# Patient Record
Sex: FEMALE | Race: WHITE | URBAN - METROPOLITAN AREA
[De-identification: names, ages, dates, MRNs, and addresses within clinical notes are randomized per-mention and may not be internally consistent; named-entity substitution may affect disease eponyms.]

---

## 2017-05-13 ENCOUNTER — HOSPITAL ENCOUNTER (EMERGENCY)
Facility: CLINIC | Age: 57
Discharge: HOME | End: 2017-05-13
Attending: EMERGENCY MEDICINE | Admitting: EMERGENCY MEDICINE
Payer: COMMERCIAL

## 2017-05-13 VITALS
RESPIRATION RATE: 18 BRPM | BODY MASS INDEX: 24.33 KG/M2 | WEIGHT: 160 LBS | TEMPERATURE: 98.9 F | SYSTOLIC BLOOD PRESSURE: 165 MMHG | DIASTOLIC BLOOD PRESSURE: 77 MMHG | OXYGEN SATURATION: 98 %

## 2017-05-13 DIAGNOSIS — R51.9 ACUTE NONINTRACTABLE HEADACHE, UNSPECIFIED HEADACHE TYPE: ICD-10-CM

## 2017-05-13 DIAGNOSIS — F41.0 PANIC ATTACK: ICD-10-CM

## 2017-05-13 PROCEDURE — 25000132 ZZH RX MED GY IP 250 OP 250 PS 637: Performed by: EMERGENCY MEDICINE

## 2017-05-13 PROCEDURE — 99283 EMERGENCY DEPT VISIT LOW MDM: CPT

## 2017-05-13 RX ORDER — LORAZEPAM 0.5 MG/1
0.5 TABLET ORAL EVERY 8 HOURS PRN
Qty: 4 TABLET | Refills: 0 | Status: ON HOLD | OUTPATIENT
Start: 2017-05-13 | End: 2017-11-05

## 2017-05-13 RX ORDER — LORAZEPAM 1 MG/1
1 TABLET ORAL ONCE
Status: COMPLETED | OUTPATIENT
Start: 2017-05-13 | End: 2017-05-13

## 2017-05-13 RX ADMIN — LORAZEPAM 1 MG: 1 TABLET ORAL at 01:46

## 2017-05-13 ASSESSMENT — ENCOUNTER SYMPTOMS
HEADACHES: 1
NERVOUS/ANXIOUS: 1

## 2017-05-13 NOTE — ED AVS SNAPSHOT
[facility] Emergency Department    [address]    [facility] [address]    Phone:  [phone]    Fax:  [phone]                                       Des Brooks   MRN: [id number]    Department:  [facility] Emergency Department   Date of Visit:  5/13/2017           After Visit Summary Signature Page     I have received my discharge instructions, and my questions have been answered. I have discussed any challenges I see with this plan with the nurse or doctor.    ..........................................................................................................................................  Patient/Patient Representative Signature      ..........................................................................................................................................  Patient Representative Print Name and Relationship to Patient    ..................................................               ................................................  Date                                            Time    ..........................................................................................................................................  Reviewed by Signature/Title    ...................................................              ..............................................  Date                                                            Time

## 2017-05-13 NOTE — ED PROVIDER NOTES
History     Chief Complaint:  Headache      HPI   Des Brooks is a 56 year old female with a history of anxiety, depression, and HTN who presents to the emergency department for evaluation of headache. The patient states that she developed a headache yesterday morning, which has been present into today. She often gets headaches with her anxiety and notes that she is feeling anxious now. Her continued headache prompted her ED visit today. She denies any recent fevers or injury or trauma to her head.     Allergies:  No Known Allergies     Medications:    Venlafaxine  Promethazine  Remeron  Losartan  Simvastatin  Aspirin  Pantoprazole  Esterified    Past Medical History:    Anxiety  Depression  GERD  HTN  hyperlipidemia    Past Surgical History:    The patient does not have any pertinent past surgical history.    Family History:    No past pertinent family history.    Social History:  Presents with her .   Negative for tobacco use.  Negative for alcohol use.  Marital Status:       Review of Systems   Neurological: Positive for headaches.   Psychiatric/Behavioral: The patient is nervous/anxious.    All other systems reviewed and are negative.    Physical Exam   First Vitals:  BP: 158/92  Heart Rate: 84  Temp: 98.9  F (37.2  C)  Resp: 18  Weight: 72.6 kg (160 lb)  SpO2: 97 %      Physical Exam  Nursing note and vitals reviewed.  Constitutional: Cooperative.    HENT:   Mouth/Throat: Mucous membranes are normal.   No neck rigidity  Eyes: Pupils are equal, round, and reactive to light. EOMI  Cardiovascular: Normal rate, regular rhythm and normal heart sounds.  No murmur.  Pulmonary/Chest: Effort normal and breath sounds normal. No respiratory distress. No wheezes. No rales. .   Musculoskeletal: Normal range of motion.Freely moving her neck.   Neurological: Alert. Oriented x4.    Skin: Skin is warm and dry.   Psychiatric: Anxious appearing. Pressured speech. No SI.     Emergency Department Course    Interventions:  0146 Lorazepam, 1 mg, PO    Emergency Department Course:  Nursing notes and vitals reviewed. I performed an exam of the patient as documented above.     IV inserted. Medicine administered as documented above.     0230 I rechecked the patient regarding her current symptoms.     Findings and plan explained to the Patient. Patient discharged home with instructions regarding supportive care, medications, and reasons to return. The importance of close follow-up was reviewed. The patient was prescribed Ativan.     Impression & Plan    Medical Decision Making:  Des Brooks is a 56 year old female who presents with anxiety and headache. She relates the headache she is having with panic attacks. She is managed on outpatient medications as above. She is requesting Ativan and after a single oral dose here, is feeling much better. She persistently asks for Ativan at discharge. I am getting some suspicion that she may be trying to obtain additional benzodiazepines. Unfortunately, I am not able to look at the prescription monitoring program this evening. I will write for four 0.5 mg tablets of Ativan and have her follow up with her regular physician as needed. No concern for acute underlying etiology for her pain, such as bleed, tumors, strokes, or trauma.     Diagnosis:    ICD-10-CM   1. Panic attack F41.0   2. Acute nonintractable headache, unspecified headache type R51       Disposition:  discharged to home    Discharge Medications:   Details   LORazepam (ATIVAN) 0.5 MG tablet Take 1 tablet (0.5 mg) by mouth every 8 hours as needed for anxiety, Disp-4 tablet, R-0, Local Print     I, Layla Vazquez, am serving as a scribe on 5/13/2017 at 1:39 AM to personally document services performed by Jt Moss MD based on my observations and the provider's statements to me.     Layla Vazquez  5/13/2017   [facility] EMERGENCY DEPARTMENT       Jt Moss MD  05/13/17 0453

## 2017-05-13 NOTE — ED NOTES
Alert and oriented x 3 airway,breathing and circulation intact, headache since this am ligfht and sound sensitive, nausea, pain is over entire head

## 2017-05-13 NOTE — ED AVS SNAPSHOT
[facility] Emergency Department    [address] [place] [address]    Phone:  [phone]    Fax:  [phone]                                       Des Brooks   MRN: [id number]    Department:  [facility] Emergency Department   Date of Visit:  5/13/2017           Patient Information     Date Of Birth          1960        Your diagnoses for this visit were:     Panic attack     Acute nonintractable headache, unspecified headache type        You were seen by Jt Moss MD.      Follow-up Information     Follow up with [facility], [place].    Specialty:  Family Practice    Why:  As needed    Contact information:    [address] [place] [address]  [phone]        Discharge References/Attachments     PANIC ATTACK (ENGLISH)      24 Hour Appointment Hotline       To make an appointment at any [facility] clinic, call [phone] ([phone]). If you don't have a family doctor or clinic, we will help you find one. [facility] clinics are conveniently located to serve the needs of you and your family.             Review of your medicines      START taking        Dose / Directions Last dose taken    LORazepam 0.5 MG tablet   Commonly known as:  ATIVAN   Dose:  0.5 mg   Quantity:  4 tablet        Take 1 tablet (0.5 mg) by mouth every 8 hours as needed for anxiety   Refills:  0          Our records show that you are taking the medicines listed below. If these are incorrect, please call your family doctor or clinic.        Dose / Directions Last dose taken    aspirin 81 MG tablet        Take by mouth daily   Refills:  0        ESTERIFIED ESTROGENS PO        Refills:  0        LOSARTAN POTASSIUM PO        Refills:  0        PANTOPRAZOLE SODIUM PO        Refills:  0        PROMETHAZINE HCL RE        Refills:  0        REMERON PO        Refills:  0        SIMVASTATIN PO        Refills:  0        VENLAFAXINE HCL PO        Refills:  0                 Prescriptions were sent or printed at these locations (1 Prescription)                   Other Prescriptions                Printed at Department/Unit printer (1 of 1)         LORazepam (ATIVAN) 0.5 MG tablet                Orders Needing Specimen Collection     None      Pending Results     No orders found from 5/11/2017 to 5/14/2017.            Pending Culture Results     No orders found from 5/11/2017 to 5/14/2017.            Pending Results Instructions     If you had any lab results that were not finalized at the time of your Discharge, you can call the ED Lab Result RN at [phone]. You will be contacted by this team for any positive Lab results or changes in treatment. The nurses are available 7 days a week from 10A to 6:30P.  You can leave a message 24 hours per day and they will return your call.        Test Results From Your Hospital Stay               Clinical Quality Measure: Blood Pressure Screening     Your blood pressure was checked while you were in the emergency department today. The last reading we obtained was  BP: 165/77 . Please read the guidelines below about what these numbers mean and what you should do about them.  If your systolic blood pressure (the top number) is less than 120 and your diastolic blood pressure (the bottom number) is less than 80, then your blood pressure is normal. There is nothing more that you need to do about it.  If your systolic blood pressure (the top number) is 120-139 or your diastolic blood pressure (the bottom number) is 80-89, your blood pressure may be higher than it should be. You should have your blood pressure rechecked within a year by a primary care provider.  If your systolic blood pressure (the top number) is 140 or greater or your diastolic blood pressure (the bottom number) is 90 or greater, you may have high blood pressure. High blood pressure is treatable, but if left untreated over time it can put you at risk for heart attack, stroke, or  "kidney failure. You should have your blood pressure rechecked by a primary care provider within the next 4 weeks.  If your provider in the emergency department today gave you specific instructions to follow-up with your doctor or provider even sooner than that, you should follow that instruction and not wait for up to 4 weeks for your follow-up visit.        Thank you for choosing [facility]       Thank you for choosing [facility] for your care. Our goal is always to provide you with excellent care. Hearing back from our patients is one way we can continue to improve our services. Please take a few minutes to complete the written survey that you may receive in the mail after you visit with us. Thank you!        [employer] Information     [employer] lets you send messages to your doctor, view your test results, renew your prescriptions, schedule appointments and more. To sign up, go to [email or web address] . Click on \"Log in\" on the left side of the screen, which will take you to the Welcome page. Then click on \"Sign up Now\" on the right side of the page.     You will be asked to enter the access code listed below, as well as some personal information. Please follow the directions to create your username and password.     Your access code is: [email or web address]  Expires: 2017  2:37 AM     Your access code will  in 90 days. If you need help or a new code, please call your [facility] clinic or [phone].        Care EveryWhere ID     This is your Care EveryWhere ID. This could be used by other organizations to access your [facility] medical records  [id number]        After Visit Summary       This is your record. Keep this with you and show to your community pharmacist(s) and doctor(s) at your next visit.                  "

## 2017-05-13 NOTE — ED NOTES
Pt states she has chronic headaches which she says are from anxiety. Pt complaining of anxiety, nausea, and stress which are her largest concern. Denies any other medical issues. ABC's intact.

## 2017-05-26 ENCOUNTER — HOSPITAL ENCOUNTER (EMERGENCY)
Facility: CLINIC | Age: 57
Discharge: HOME | End: 2017-05-26
Attending: EMERGENCY MEDICINE | Admitting: EMERGENCY MEDICINE
Payer: COMMERCIAL

## 2017-05-26 VITALS
OXYGEN SATURATION: 98 % | BODY MASS INDEX: 24.94 KG/M2 | DIASTOLIC BLOOD PRESSURE: 100 MMHG | WEIGHT: 164 LBS | TEMPERATURE: 98.3 F | HEART RATE: 78 BPM | SYSTOLIC BLOOD PRESSURE: 153 MMHG | RESPIRATION RATE: 20 BRPM

## 2017-05-26 DIAGNOSIS — F41.9 ANXIETY AND DEPRESSION: ICD-10-CM

## 2017-05-26 DIAGNOSIS — F32.A ANXIETY AND DEPRESSION: ICD-10-CM

## 2017-05-26 PROCEDURE — 99285 EMERGENCY DEPT VISIT HI MDM: CPT | Mod: 25

## 2017-05-26 PROCEDURE — 25000132 ZZH RX MED GY IP 250 OP 250 PS 637: Performed by: EMERGENCY MEDICINE

## 2017-05-26 PROCEDURE — 90791 PSYCH DIAGNOSTIC EVALUATION: CPT

## 2017-05-26 RX ORDER — ALPRAZOLAM 0.5 MG
0.5 TABLET ORAL ONCE
Status: COMPLETED | OUTPATIENT
Start: 2017-05-26 | End: 2017-05-26

## 2017-05-26 RX ADMIN — ALPRAZOLAM 0.5 MG: 0.5 TABLET ORAL at 17:57

## 2017-05-26 NOTE — ED NOTES
"Pt arrives to the ED for depression. Pt crying on arrival. Pt states she was at the mall yesterday when she went to show her  a knife to see if it was good and was caught by store employees and told she was a thief, police were called. Pt states she was not trying to steal but that they said hurtful things to her and told her she should be in [facility]. Pt states \"I wanted to die\". Per  she did not sleep last night and has not ate anything today. Pt visibly upset. Pt denies any plan of hurting herself at this time but does have thoughts of suicide.   "

## 2017-05-26 NOTE — ED AVS SNAPSHOT
[facility] Emergency Department    [address] [facility] [address]    Phone:  [phone]    Fax:  [phone]                                       Des Brooks   MRN: [id number]    Department:  [facility] Emergency Department   Date of Visit:  5/26/2017           Patient Information     Date Of Birth          1960        Your diagnoses for this visit were:     Anxiety and depression        You were seen by Rodolfo Zarate MD.      Follow-up Information     Call Osiel Carballo MD.    Why:  call Monday    Contact information:    [facility]  [address] [place] [address]  [phone]          Follow up with [facility] Emergency Department.    Specialty:  EMERGENCY MEDICINE    Why:  As needed, If symptoms worsen    Contact information:    [address] [place] [id number] [phone]      Discharge References/Attachments     ANXIETY DISORDERS, TREATING, WITH MEDICATION (ENGLISH)    ANXIETY DISORDERS, TREATING, WITH THERAPY   (ENGLISH)      24 Hour Appointment Hotline       To make an appointment at any [facility], call [phone] ([phone]). If you don't have a family doctor or clinic, we will help you find one. [facility] clinics are conveniently located to serve the needs of you and your family.             Review of your medicines      Our records show that you are taking the medicines listed below. If these are incorrect, please call your family doctor or clinic.        Dose / Directions Last dose taken    aspirin 81 MG tablet        Take by mouth daily   Refills:  0        ESTERIFIED ESTROGENS PO        Refills:  0        LORazepam 0.5 MG tablet   Commonly known as:  ATIVAN   Dose:  0.5 mg   Quantity:  4 tablet        Take 1 tablet (0.5 mg) by mouth every 8 hours as needed for anxiety   Refills:  0        LOSARTAN POTASSIUM PO        Refills:  0        PANTOPRAZOLE SODIUM PO         Refills:  0        PROMETHAZINE HCL RE        Refills:  0        REMERON PO        Refills:  0        SIMVASTATIN PO        Refills:  0        VENLAFAXINE HCL PO        Refills:  0                Orders Needing Specimen Collection     None      Pending Results     No orders found from 5/24/2017 to 5/27/2017.            Pending Culture Results     No orders found from 5/24/2017 to 5/27/2017.            Pending Results Instructions     If you had any lab results that were not finalized at the time of your Discharge, you can call the ED Lab Result RN at [phone]. You will be contacted by this team for any positive Lab results or changes in treatment. The nurses are available 7 days a week from 10A to 6:30P.  You can leave a message 24 hours per day and they will return your call.        Test Results From Your Hospital Stay               Clinical Quality Measure: Blood Pressure Screening     Your blood pressure was checked while you were in the emergency department today. The last reading we obtained was  BP: (!) 171/100 . Please read the guidelines below about what these numbers mean and what you should do about them.  If your systolic blood pressure (the top number) is less than 120 and your diastolic blood pressure (the bottom number) is less than 80, then your blood pressure is normal. There is nothing more that you need to do about it.  If your systolic blood pressure (the top number) is 120-139 or your diastolic blood pressure (the bottom number) is 80-89, your blood pressure may be higher than it should be. You should have your blood pressure rechecked within a year by a primary care provider.  If your systolic blood pressure (the top number) is 140 or greater or your diastolic blood pressure (the bottom number) is 90 or greater, you may have high blood pressure. High blood pressure is treatable, but if left untreated over time it can put you at risk for heart attack, stroke, or kidney failure. You should  "have your blood pressure rechecked by a primary care provider within the next 4 weeks.  If your provider in the emergency department today gave you specific instructions to follow-up with your doctor or provider even sooner than that, you should follow that instruction and not wait for up to 4 weeks for your follow-up visit.        Thank you for choosing [facility]       Thank you for choosing [facility] for your care. Our goal is always to provide you with excellent care. Hearing back from our patients is one way we can continue to improve our services. Please take a few minutes to complete the written survey that you may receive in the mail after you visit with us. Thank you!        [employer] Information     [employer] lets you send messages to your doctor, view your test results, renew your prescriptions, schedule appointments and more. To sign up, go to [email or web address] . Click on \"Log in\" on the left side of the screen, which will take you to the Welcome page. Then click on \"Sign up Now\" on the right side of the page.     You will be asked to enter the access code listed below, as well as some personal information. Please follow the directions to create your username and password.     Your access code is: [email or web address]  Expires: 2017  2:37 AM     Your access code will  in 90 days. If you need help or a new code, please call your [facility] clinic or [phone].        Care EveryWhere ID     This is your Care EveryWhere ID. This could be used by other organizations to access your [facility] medical records  [id number]        After Visit Summary       This is your record. Keep this with you and show to your community pharmacist(s) and doctor(s) at your next visit.                  "

## 2017-05-26 NOTE — ED AVS SNAPSHOT
[facility] Emergency Department    [address]    [facility] [address]    Phone:  [phone]    Fax:  [phone]                                       Des Brooks   MRN: [id number]    Department:  [facility] Emergency Department   Date of Visit:  5/26/2017           After Visit Summary Signature Page     I have received my discharge instructions, and my questions have been answered. I have discussed any challenges I see with this plan with the nurse or doctor.    ..........................................................................................................................................  Patient/Patient Representative Signature      ..........................................................................................................................................  Patient Representative Print Name and Relationship to Patient    ..................................................               ................................................  Date                                            Time    ..........................................................................................................................................  Reviewed by Signature/Title    ...................................................              ..............................................  Date                                                            Time

## 2017-05-31 NOTE — ED PROVIDER NOTES
History     Chief Complaint:  Depression    HPI   Des Brooks is a 56 year old female who presents with anxiety and depression. Pt crying on arrival. Pt has had worsening symptoms over the last several days, but things got worse yesterday when she states she was accused of stealing. Police were called. Pt reports she has a court date coming up. Pt had tough time sleeping last night and hasn't eaten today. Pt sees a psychologist and psychiatrist and is on medication. She reports the xanax she takes hasn't worked today. Pt reports feeling like she wanted to die, but denies any current plan.     Allergies:  No Known Allergies     Medications:      VENLAFAXINE HCL PO   PROMETHAZINE HCL RE   Mirtazapine (REMERON PO)   LORazepam (ATIVAN) 0.5 MG tablet   LOSARTAN POTASSIUM PO   SIMVASTATIN PO   aspirin 81 MG tablet   ESTERIFIED ESTROGENS PO   PANTOPRAZOLE SODIUM PO       Problem List:    There are no active problems to display for this patient.       Past Medical History:    Past Medical History:   Diagnosis Date     Anxiety      Depressive disorder      GERD (gastroesophageal reflux disease)      Hypercholesterolemia      Hypertension        Past Surgical History:    History reviewed. No pertinent surgical history.    Family History:    No family history on file.    Social History:  Marital Status:   [2]  Social History   Substance Use Topics     Smoking status: Never Smoker     Smokeless tobacco: Not on file     Alcohol use No        Review of Systems  Positive for anxiety and depression  All other systems reviewed and negative.     Physical Exam   First Vitals:  BP: (!) 171/100  Pulse: 78  Heart Rate: 78  Temp: 98.3  F (36.8  C)  Resp: 20  Weight: 74.4 kg (164 lb)  SpO2: 94 %    Physical Exam  General: Patient is alert and interactive when I enter the room  Head:  The scalp, face, and head appear normal  Eyes:  Conjunctivae are normal  Neck:  Trachea midline  CV:  Normal rate.  Resp:  No respiratory distress    Musc:  Normal muscular tone    No major joint effusions    No asymmetric leg swelling    Good capillary refill noted  Skin:  No rash or lesions noted  Neuro: Speech is normal and fluent. Face is symmetric.     Moving all extremities well.   Psych:  Depressed mood, congruent affect. Poor insight and judgment.         Emergency Department Course           Interventions:  Medications   ALPRAZolam (XANAX) tablet 0.5 mg (0.5 mg Oral Given 5/26/17 7504)         Impression & Plan         Medical Decision Making:  Pt presents with anxiety and depression. She has suicidal thoughts but no plan. She is already under the care of a psychiatrist/psychologist. She is given xanax here with good improvement in her symptoms. DEC saw and evaluated pt here and pt contracts for safety. No indication for involuntary hold at this time. Pt does not want voluntary hold and wishes to return home. She will continue to use xanax for panic attacks and follow-up with her outpt providers.  feels pt will be safe at home. She is discharged in stable condition.     Diagnosis:    ICD-10-CM    1. Anxiety and depression F41.9     F32.9        Disposition:  discharged to home    Discharge Medications:  Discharge Medication List as of 5/26/2017  8:54 PM            Rodolfo Zarate  5/26/2017   [facility] EMERGENCY DEPARTMENT       Rodolfo Zarate MD  05/31/17 0407

## 2017-06-26 ENCOUNTER — HOSPITAL ENCOUNTER (EMERGENCY)
Facility: CLINIC | Age: 57
Discharge: HOME | End: 2017-06-27
Attending: EMERGENCY MEDICINE | Admitting: EMERGENCY MEDICINE
Payer: COMMERCIAL

## 2017-06-26 VITALS
OXYGEN SATURATION: 98 % | HEART RATE: 73 BPM | DIASTOLIC BLOOD PRESSURE: 87 MMHG | SYSTOLIC BLOOD PRESSURE: 132 MMHG | RESPIRATION RATE: 16 BRPM | TEMPERATURE: 98.8 F

## 2017-06-26 DIAGNOSIS — F41.9 ANXIETY: ICD-10-CM

## 2017-06-26 PROCEDURE — 99284 EMERGENCY DEPT VISIT MOD MDM: CPT | Mod: 25

## 2017-06-26 PROCEDURE — 25000128 H RX IP 250 OP 636: Performed by: EMERGENCY MEDICINE

## 2017-06-26 PROCEDURE — 96372 THER/PROPH/DIAG INJ SC/IM: CPT

## 2017-06-26 RX ORDER — DIAZEPAM 2 MG
2 TABLET ORAL EVERY 12 HOURS PRN
Qty: 20 TABLET | Refills: 0 | Status: ON HOLD | OUTPATIENT
Start: 2017-06-26 | End: 2017-11-05

## 2017-06-26 RX ORDER — ALPRAZOLAM 1 MG
TABLET ORAL
Qty: 15 TABLET | Refills: 0 | Status: SHIPPED | OUTPATIENT
Start: 2017-06-26

## 2017-06-26 RX ORDER — DIAZEPAM 10 MG/2ML
5 INJECTION, SOLUTION INTRAMUSCULAR; INTRAVENOUS ONCE
Status: COMPLETED | OUTPATIENT
Start: 2017-06-26 | End: 2017-06-26

## 2017-06-26 RX ADMIN — DIAZEPAM 5 MG: 5 INJECTION, SOLUTION INTRAMUSCULAR; INTRAVENOUS at 22:41

## 2017-06-26 ASSESSMENT — ENCOUNTER SYMPTOMS
VOMITING: 0
ABDOMINAL PAIN: 1
NERVOUS/ANXIOUS: 1
NAUSEA: 1

## 2017-06-26 NOTE — ED AVS SNAPSHOT
[facility] Emergency Department    [address] [place] [address]    Phone:  [phone]    Fax:  [phone]                                       Des Brooks   MRN: [id number]    Department:  [facility] Emergency Department   Date of Visit:  6/26/2017           Patient Information     Date Of Birth          1960        Your diagnoses for this visit were:     Anxiety        You were seen by Maisha Dorsey MD.      Follow-up Information     Follow up with Thang Arenas MD.    Specialty:  Internal Medicine    Contact information:    [facility]  [address]  [place] [address]  [phone]        24 Hour Appointment Hotline       To make an appointment at any [facility], call [phone] ([phone]). If you don't have a family doctor or clinic, we will help you find one. [facility] are conveniently located to serve the needs of you and your family.             Review of your medicines      START taking        Dose / Directions Last dose taken    ALPRAZolam 1 MG tablet   Commonly known as:  XANAX   Quantity:  15 tablet        Take 0.5mg to 1mg every 8-12 hrs as needed   Refills:  0        diazepam 2 MG tablet   Commonly known as:  VALIUM   Dose:  2 mg   Quantity:  20 tablet        Take 1 tablet (2 mg) by mouth every 12 hours as needed for anxiety or sleep   Refills:  0          Our records show that you are taking the medicines listed below. If these are incorrect, please call your family doctor or clinic.        Dose / Directions Last dose taken    aspirin 81 MG tablet        Take by mouth daily   Refills:  0        ATORVASTATIN CALCIUM PO        Refills:  0        ESTERIFIED ESTROGENS PO        Refills:  0        LORazepam 0.5 MG tablet   Commonly known as:  ATIVAN   Dose:  0.5 mg   Quantity:  4 tablet        Take 1 tablet (0.5 mg) by mouth every 8 hours as needed for anxiety   Refills:  0        LOSARTAN POTASSIUM  PO        Refills:  0        PANTOPRAZOLE SODIUM PO        Refills:  0        PROMETHAZINE HCL RE        Refills:  0        REMERON PO        Refills:  0        SIMVASTATIN PO        Refills:  0        TRAZODONE HCL PO        Refills:  0        VENLAFAXINE HCL PO        Refills:  0        ZOLOFT PO        Take by mouth daily   Refills:  0                Prescriptions were sent or printed at these locations (2 Prescriptions)                   Other Prescriptions                Printed at Department/Unit printer (2 of 2)         ALPRAZolam (XANAX) 1 MG tablet               diazepam (VALIUM) 2 MG tablet                Orders Needing Specimen Collection     None      Pending Results     No orders found for last 3 day(s).            Pending Culture Results     No orders found for last 3 day(s).            Pending Results Instructions     If you had any lab results that were not finalized at the time of your Discharge, you can call the ED Lab Result RN at [phone]. You will be contacted by this team for any positive Lab results or changes in treatment. The nurses are available 7 days a week from 10A to 6:30P.  You can leave a message 24 hours per day and they will return your call.        Test Results From Your Hospital Stay               Clinical Quality Measure: Blood Pressure Screening     Your blood pressure was checked while you were in the emergency department today. The last reading we obtained was  BP: 132/87 . Please read the guidelines below about what these numbers mean and what you should do about them.  If your systolic blood pressure (the top number) is less than 120 and your diastolic blood pressure (the bottom number) is less than 80, then your blood pressure is normal. There is nothing more that you need to do about it.  If your systolic blood pressure (the top number) is 120-139 or your diastolic blood pressure (the bottom number) is 80-89, your blood pressure may be higher than it should be. You  "should have your blood pressure rechecked within a year by a primary care provider.  If your systolic blood pressure (the top number) is 140 or greater or your diastolic blood pressure (the bottom number) is 90 or greater, you may have high blood pressure. High blood pressure is treatable, but if left untreated over time it can put you at risk for heart attack, stroke, or kidney failure. You should have your blood pressure rechecked by a primary care provider within the next 4 weeks.  If your provider in the emergency department today gave you specific instructions to follow-up with your doctor or provider even sooner than that, you should follow that instruction and not wait for up to 4 weeks for your follow-up visit.        Thank you for choosing [facility]       Thank you for choosing [facility] for your care. Our goal is always to provide you with excellent care. Hearing back from our patients is one way we can continue to improve our services. Please take a few minutes to complete the written survey that you may receive in the mail after you visit with us. Thank you!        [employer] Information     [employer] lets you send messages to your doctor, view your test results, renew your prescriptions, schedule appointments and more. To sign up, go to [email or web address] . Click on \"Log in\" on the left side of the screen, which will take you to the Welcome page. Then click on \"Sign up Now\" on the right side of the page.     You will be asked to enter the access code listed below, as well as some personal information. Please follow the directions to create your username and password.     Your access code is: [email or web address]  Expires: 2017  2:37 AM     Your access code will  in 90 days. If you need help or a new code, please call your [facility] clinic or [phone].        Care EveryWhere ID     This is your Care EveryWhere ID. This could be used by other organizations to access your [facility] medical " records  [id number]        Equal Access to Services     ADIA PATEL : Mikki Borja, dilia yousif, judith najera, shey green. So [facility] [phone].    ATENCIÓN: Si habla español, tiene a gibson disposición servicios gratuitos de asistencia lingüística. Llame al [phone].    We comply with applicable federal civil rights laws and [place] laws. We do not discriminate on the basis of race, color, national origin, age, disability sex, sexual orientation or gender identity.            After Visit Summary       This is your record. Keep this with you and show to your community pharmacist(s) and doctor(s) at your next visit.

## 2017-06-27 ASSESSMENT — ENCOUNTER SYMPTOMS
NECK PAIN: 1
FEVER: 0
SHORTNESS OF BREATH: 0
CHILLS: 0

## 2017-06-27 NOTE — ED NOTES
"Pt reports feelings of anxiety and depression for past few months. States she has seen a psychiatrist who prescribed effexor with no relief of symptoms so she was switched to zoloft and trazodone one week ago. Pt c/o associated symptoms of nausea, tightness in upper chest/neck. Pt states \"I have been taking zofran with no relief\". Denies vomiting.  Pt is alert and oriented, ABCs intact.   "

## 2017-06-27 NOTE — ED PROVIDER NOTES
History     Chief Complaint:  Anxiety     HPI   Des Brooks is a 56 year old female with a history of anxiety who presents to the emergency department today for evaluation of anxiety. The patient complains of chronic anxiety that worsened this evening. Patient was managing her anxiety with Effexor but was recently switched to Zoloft by her psychiatrist 7 days ago. Patient has also used 0.25 mg Xanax but she ran out of this medication.  She notes her anxiety did not improve with these medications. Patient also reports associated lower neck discomfort, nausea, and diffuse abdominal cramping. She has used Zofran with no improvement in nausea. Patient was able to eat breakfast and lunch but she did not eat dinner. Herbertt has been seen in the ED 2-3 in the past for her anxiety. No vomiting. No chest pain. No shortness of breath.No fevers. No chills. No suicidal ideation.     Allergies:  No Known Drug Allergies    Medications:    ATORVASTATIN CALCIUM PO  Sertraline HCl (ZOLOFT PO)  TRAZODONE HCL PO  Mirtazapine (REMERON PO)  LORazepam (ATIVAN) 0.5 MG tablet  LOSARTAN POTASSIUM PO  SIMVASTATIN PO  aspirin 81 MG tablet  VENLAFAXINE HCL PO  PROMETHAZINE HCL RE  ESTERIFIED ESTROGENS PO  PANTOPRAZOLE SODIUM PO  Xanax       Past Medical History:    Anxiety   Depressive disorder   GERD (gastroesophageal reflux disease)   Hypercholesterolemia   Hypertension       Past Surgical History:    History reviewed. No pertinent past surgical history.    Family History:    History reviewed. No pertinent family history.     Social History:  The patient was accompanied to the ED by .  Smoking Status: Never smoker  Smokeless Tobacco: No  Alcohol Use: No  Marital Status:   [2]     Review of Systems   Constitutional: Negative for chills and fever.   HENT:        Throat pain   Respiratory: Negative for shortness of breath.    Cardiovascular: Negative for chest pain.   Gastrointestinal: Positive for abdominal pain and nausea.  "Negative for vomiting.   Musculoskeletal: Positive for neck pain.   Psychiatric/Behavioral: Negative for suicidal ideas. The patient is nervous/anxious.    All other systems reviewed and are negative.    Pt reports feelings of anxiety and depression for past few months. States she has seen a psychiatrist who prescribed effexor with no relief of symptoms so she was switched to zoloft and trazodone one week ago. Pt c/o associated symptoms of nausea, tightness in upper chest/neck. Pt states \"I have been taking zofran with no relief\". Denies vomiting.  Physical Exam   Vitals:  Patient Vitals for the past 24 hrs:   BP Temp Temp src Pulse Heart Rate Resp SpO2   06/26/17 2210 - 98.8  F (37.1  C) Temporal - - - -   06/26/17 2205 132/87 - - 73 73 16 98 %       Physical Exam  GEN: patient conversive  HEAD: atraumatic, normocephalic  EYES: pupils reactive,  extraocular muscles intact, conjunctivae normal  ENT: TMs flat and white bilaterally, oropharynx normal with no erythema or exudate, mucus membranes moist  NECK: no cervical LAD  RESPIRATORY: no tachypnea, breath sounds clear to auscultation (no rales, wheezes, rhonchi)  CVS: normal S1/S2, no murmurs/rubs/gallops  ABDOMEN: soft, nontender, no masses or organomegaly, no rebound, positive bowel sounds  BACK: no CVAT  EXTREMITIES: intact pulses x 2 (radial pulses intact) no edema  SKIN: warm and dry  NEURO: Alert  Motor- moves all 4 extremities Sensation- intact  Coordination-ambulatory.  Overall symmetrical exam  HEME: no bruising or petechiae/contusions  LYMPH: no lymphadenopathy    Emergency Department Course   Interventions:  2241- Valium 5 mg IM       Emergency Department Course:  Nursing notes and vitals reviewed.  I performed an exam of the patient as documented above.       11:52 PM recheck    I discussed the treatment plan with the patient. They expressed understanding of this plan and consented to discharge.    I personally reviewed the laboratory results with the " Patient and answered all related questions prior to discharge.  /87  Pulse 73  Temp 98.8  F (37.1  C) (Temporal)  Resp 16  SpO2 98%  Patient and  much improved, given a plan for followup    Impression & Plan      Medical Decision Making:  Des Brooks is a 56 year old female who does have a history of anxiety who presents with similar episode of anxiety. She used to be on Xanax .25 mg but her primary psychiatrist did not provide enough. She is recently switched to another psychiatric medicine and states that she would like an injection to help with the discomfort. Denies any fevers or chills. No chest pain or shortness of breath. She has not had thoughts of hurting herself. Here she requested an injection and we did give an IM injection. She did feel better and more calm. I did write her for the higher dose of Xanax and she would like to try Valium so I did give her limited prescription and low doses on both. She will call her psychiatrist tomorrow to get an appointment. Vitals signs were stable and medical exam did not show any abnormalities .      Diagnosis:    ICD-10-CM    1. Anxiety F41.9          Disposition:   The patient was discharged.    Discharge Medications:  New Prescriptions    ALPRAZOLAM (XANAX) 1 MG TABLET    Take 0.5mg to 1mg every 8-12 hrs as needed    DIAZEPAM (VALIUM) 2 MG TABLET    Take 1 tablet (2 mg) by mouth every 12 hours as needed for anxiety or sleep       Scribe Disclosure:  Lonnie FRANCE, am serving as a scribe at 10:34 PM on 6/26/2017 to document services personally performed by Maisha Dorsey MD, based on my observations and the provider's statements to me.    6/26/2017   [facility] EMERGENCY DEPARTMENT       Maisha Dorsey MD  06/27/17 0154

## 2017-07-12 NOTE — ED NOTES
"In reapply to pt's amendment request to triage note. While I recall pt stated \"knife\", I can not deny or confirm due to pt vigorously crying and having accent.   "

## 2017-09-08 NOTE — ED NOTES
Addendum.    On 8/30/2017 documentation was presented to myself, ED Manager, that supports claim of reporting clothing vs knife to RN.  Late entry - 9/8/2017 at 1308.

## 2017-11-05 ENCOUNTER — HOSPITAL ENCOUNTER (EMERGENCY)
Facility: CLINIC | Age: 57
Discharge: PSYCHIATRIC OR SUBSTANCE USE FACILITY | End: 2017-11-05
Attending: EMERGENCY MEDICINE | Admitting: EMERGENCY MEDICINE
Payer: COMMERCIAL

## 2017-11-05 ENCOUNTER — HOSPITAL ENCOUNTER (INPATIENT)
Facility: CLINIC | Age: 57
LOS: 1 days | Discharge: HOME | DRG: 885 | End: 2017-11-06
Attending: PSYCHIATRY & NEUROLOGY | Admitting: PSYCHIATRY & NEUROLOGY
Payer: COMMERCIAL

## 2017-11-05 VITALS
TEMPERATURE: 98.7 F | SYSTOLIC BLOOD PRESSURE: 116 MMHG | DIASTOLIC BLOOD PRESSURE: 57 MMHG | RESPIRATION RATE: 30 BRPM | OXYGEN SATURATION: 93 % | HEART RATE: 95 BPM

## 2017-11-05 DIAGNOSIS — F32.A DEPRESSION, UNSPECIFIED DEPRESSION TYPE: ICD-10-CM

## 2017-11-05 DIAGNOSIS — F41.9 ANXIETY: ICD-10-CM

## 2017-11-05 LAB
ALBUMIN SERPL-MCNC: 3.5 G/DL (ref 3.4–5)
ALBUMIN UR-MCNC: NEGATIVE MG/DL
ALP SERPL-CCNC: 84 U/L (ref 40–150)
ALT SERPL W P-5'-P-CCNC: 25 U/L (ref 0–50)
AMPHETAMINES UR QL SCN: NEGATIVE
ANION GAP SERPL CALCULATED.3IONS-SCNC: 8 MMOL/L (ref 3–14)
APAP SERPL-MCNC: <2 MG/L (ref 10–20)
APPEARANCE UR: CLEAR
AST SERPL W P-5'-P-CCNC: 15 U/L (ref 0–45)
BACTERIA #/AREA URNS HPF: ABNORMAL /HPF
BARBITURATES UR QL: NEGATIVE
BASOPHILS # BLD AUTO: 0.1 10E9/L (ref 0–0.2)
BASOPHILS NFR BLD AUTO: 0.9 %
BENZODIAZ UR QL: POSITIVE
BILIRUB SERPL-MCNC: 0.2 MG/DL (ref 0.2–1.3)
BILIRUB UR QL STRIP: NEGATIVE
BUN SERPL-MCNC: 18 MG/DL (ref 7–30)
CALCIUM SERPL-MCNC: 8.8 MG/DL (ref 8.5–10.1)
CANNABINOIDS UR QL SCN: NEGATIVE
CHLORIDE SERPL-SCNC: 106 MMOL/L (ref 94–109)
CO2 SERPL-SCNC: 26 MMOL/L (ref 20–32)
COCAINE UR QL: NEGATIVE
COLOR UR AUTO: ABNORMAL
CREAT SERPL-MCNC: 0.73 MG/DL (ref 0.52–1.04)
DIFFERENTIAL METHOD BLD: NORMAL
EOSINOPHIL # BLD AUTO: 0.3 10E9/L (ref 0–0.7)
EOSINOPHIL NFR BLD AUTO: 4 %
ERYTHROCYTE [DISTWIDTH] IN BLOOD BY AUTOMATED COUNT: 14.1 % (ref 10–15)
GFR SERPL CREATININE-BSD FRML MDRD: 82 ML/MIN/1.7M2
GLUCOSE SERPL-MCNC: 113 MG/DL (ref 70–99)
GLUCOSE UR STRIP-MCNC: NEGATIVE MG/DL
HCG UR QL: NEGATIVE
HCT VFR BLD AUTO: 43 % (ref 35–47)
HGB BLD-MCNC: 13.9 G/DL (ref 11.7–15.7)
HGB UR QL STRIP: ABNORMAL
IMM GRANULOCYTES # BLD: 0 10E9/L (ref 0–0.4)
IMM GRANULOCYTES NFR BLD: 0.5 %
KETONES UR STRIP-MCNC: NEGATIVE MG/DL
LEUKOCYTE ESTERASE UR QL STRIP: ABNORMAL
LIPASE SERPL-CCNC: 222 U/L (ref 73–393)
LYMPHOCYTES # BLD AUTO: 2.4 10E9/L (ref 0.8–5.3)
LYMPHOCYTES NFR BLD AUTO: 37.2 %
MCH RBC QN AUTO: 28 PG (ref 26.5–33)
MCHC RBC AUTO-ENTMCNC: 32.3 G/DL (ref 31.5–36.5)
MCV RBC AUTO: 87 FL (ref 78–100)
MONOCYTES # BLD AUTO: 0.6 10E9/L (ref 0–1.3)
MONOCYTES NFR BLD AUTO: 8.9 %
MUCOUS THREADS #/AREA URNS LPF: PRESENT /LPF
NEUTROPHILS # BLD AUTO: 3.2 10E9/L (ref 1.6–8.3)
NEUTROPHILS NFR BLD AUTO: 48.5 %
NITRATE UR QL: NEGATIVE
NRBC # BLD AUTO: 0 10*3/UL
NRBC BLD AUTO-RTO: 0 /100
OPIATES UR QL SCN: NEGATIVE
PCP UR QL SCN: NEGATIVE
PH UR STRIP: 9 PH (ref 5–7)
PLATELET # BLD AUTO: 417 10E9/L (ref 150–450)
POTASSIUM SERPL-SCNC: 3.8 MMOL/L (ref 3.4–5.3)
PROT SERPL-MCNC: 7.8 G/DL (ref 6.8–8.8)
RBC # BLD AUTO: 4.96 10E12/L (ref 3.8–5.2)
RBC #/AREA URNS AUTO: 2 /HPF (ref 0–2)
SALICYLATES SERPL-MCNC: <2 MG/DL
SODIUM SERPL-SCNC: 140 MMOL/L (ref 133–144)
SOURCE: ABNORMAL
SP GR UR STRIP: 1 (ref 1–1.03)
SQUAMOUS #/AREA URNS AUTO: 4 /HPF (ref 0–1)
TROPONIN I SERPL-MCNC: <0.015 UG/L (ref 0–0.04)
UROBILINOGEN UR STRIP-MCNC: 0 MG/DL (ref 0–2)
WBC # BLD AUTO: 6.5 10E9/L (ref 4–11)
WBC #/AREA URNS AUTO: 6 /HPF (ref 0–2)

## 2017-11-05 PROCEDURE — 90791 PSYCH DIAGNOSTIC EVALUATION: CPT

## 2017-11-05 PROCEDURE — 81001 URINALYSIS AUTO W/SCOPE: CPT | Mod: 59 | Performed by: EMERGENCY MEDICINE

## 2017-11-05 PROCEDURE — 93005 ELECTROCARDIOGRAM TRACING: CPT

## 2017-11-05 PROCEDURE — 84484 ASSAY OF TROPONIN QUANT: CPT | Performed by: EMERGENCY MEDICINE

## 2017-11-05 PROCEDURE — 81025 URINE PREGNANCY TEST: CPT | Performed by: EMERGENCY MEDICINE

## 2017-11-05 PROCEDURE — 80053 COMPREHEN METABOLIC PANEL: CPT | Performed by: EMERGENCY MEDICINE

## 2017-11-05 PROCEDURE — 80307 DRUG TEST PRSMV CHEM ANLYZR: CPT | Performed by: EMERGENCY MEDICINE

## 2017-11-05 PROCEDURE — 83690 ASSAY OF LIPASE: CPT | Performed by: EMERGENCY MEDICINE

## 2017-11-05 PROCEDURE — 80329 ANALGESICS NON-OPIOID 1 OR 2: CPT | Performed by: EMERGENCY MEDICINE

## 2017-11-05 PROCEDURE — 25000132 ZZH RX MED GY IP 250 OP 250 PS 637: Performed by: PSYCHIATRY & NEUROLOGY

## 2017-11-05 PROCEDURE — 99285 EMERGENCY DEPT VISIT HI MDM: CPT | Mod: 25

## 2017-11-05 PROCEDURE — 12400007 ZZH R&B MH INTERMEDIATE UMMC

## 2017-11-05 PROCEDURE — 25000128 H RX IP 250 OP 636: Performed by: EMERGENCY MEDICINE

## 2017-11-05 PROCEDURE — 87086 URINE CULTURE/COLONY COUNT: CPT | Performed by: PHYSICIAN ASSISTANT

## 2017-11-05 PROCEDURE — 85025 COMPLETE CBC W/AUTO DIFF WBC: CPT | Performed by: EMERGENCY MEDICINE

## 2017-11-05 RX ORDER — BISACODYL 10 MG
10 SUPPOSITORY, RECTAL RECTAL DAILY PRN
Status: DISCONTINUED | OUTPATIENT
Start: 2017-11-05 | End: 2017-11-06 | Stop reason: HOSPADM

## 2017-11-05 RX ORDER — ACETAMINOPHEN 325 MG/1
650 TABLET ORAL EVERY 4 HOURS PRN
Status: DISCONTINUED | OUTPATIENT
Start: 2017-11-05 | End: 2017-11-06 | Stop reason: HOSPADM

## 2017-11-05 RX ORDER — OLANZAPINE 2.5 MG/1
2.5 TABLET, FILM COATED ORAL
Status: DISCONTINUED | OUTPATIENT
Start: 2017-11-05 | End: 2017-11-06 | Stop reason: HOSPADM

## 2017-11-05 RX ORDER — ASPIRIN 81 MG/1
81 TABLET ORAL DAILY
Status: DISCONTINUED | OUTPATIENT
Start: 2017-11-06 | End: 2017-11-06 | Stop reason: HOSPADM

## 2017-11-05 RX ORDER — PROMETHAZINE HYDROCHLORIDE 25 MG/1
25 TABLET ORAL EVERY 6 HOURS PRN
Status: DISCONTINUED | OUTPATIENT
Start: 2017-11-05 | End: 2017-11-06 | Stop reason: HOSPADM

## 2017-11-05 RX ORDER — TRAZODONE HYDROCHLORIDE 50 MG/1
50 TABLET, FILM COATED ORAL
Status: DISCONTINUED | OUTPATIENT
Start: 2017-11-05 | End: 2017-11-06 | Stop reason: HOSPADM

## 2017-11-05 RX ORDER — ALUMINA, MAGNESIA, AND SIMETHICONE 2400; 2400; 240 MG/30ML; MG/30ML; MG/30ML
30 SUSPENSION ORAL EVERY 4 HOURS PRN
Status: DISCONTINUED | OUTPATIENT
Start: 2017-11-05 | End: 2017-11-06 | Stop reason: HOSPADM

## 2017-11-05 RX ORDER — HYDROXYZINE HYDROCHLORIDE 25 MG/1
25-50 TABLET, FILM COATED ORAL EVERY 4 HOURS PRN
Status: DISCONTINUED | OUTPATIENT
Start: 2017-11-05 | End: 2017-11-06 | Stop reason: HOSPADM

## 2017-11-05 RX ORDER — PROMETHAZINE HYDROCHLORIDE 25 MG/1
TABLET ORAL EVERY 6 HOURS PRN
COMMUNITY

## 2017-11-05 RX ORDER — MAGNESIUM OXIDE 400 MG/1
400 TABLET ORAL 3 TIMES DAILY
Status: DISCONTINUED | OUTPATIENT
Start: 2017-11-05 | End: 2017-11-06 | Stop reason: HOSPADM

## 2017-11-05 RX ORDER — LORAZEPAM 0.5 MG/1
0.5 TABLET ORAL EVERY 8 HOURS PRN
Status: DISCONTINUED | OUTPATIENT
Start: 2017-11-05 | End: 2017-11-06

## 2017-11-05 RX ORDER — ATORVASTATIN CALCIUM 10 MG/1
10 TABLET, FILM COATED ORAL EVERY EVENING
Status: DISCONTINUED | OUTPATIENT
Start: 2017-11-05 | End: 2017-11-06 | Stop reason: HOSPADM

## 2017-11-05 RX ORDER — LORAZEPAM 2 MG/ML
1 INJECTION INTRAMUSCULAR ONCE
Status: COMPLETED | OUTPATIENT
Start: 2017-11-05 | End: 2017-11-05

## 2017-11-05 RX ORDER — MIRTAZAPINE 7.5 MG/1
7.5 TABLET, FILM COATED ORAL 2 TIMES DAILY
Status: DISCONTINUED | OUTPATIENT
Start: 2017-11-06 | End: 2017-11-06

## 2017-11-05 RX ORDER — MIRTAZAPINE 15 MG/1
15 TABLET, FILM COATED ORAL AT BEDTIME
Status: DISCONTINUED | OUTPATIENT
Start: 2017-11-05 | End: 2017-11-06 | Stop reason: HOSPADM

## 2017-11-05 RX ORDER — LOSARTAN POTASSIUM 25 MG/1
25 TABLET ORAL DAILY
Status: DISCONTINUED | OUTPATIENT
Start: 2017-11-06 | End: 2017-11-06 | Stop reason: HOSPADM

## 2017-11-05 RX ORDER — OLANZAPINE 10 MG/2ML
2.5 INJECTION, POWDER, FOR SOLUTION INTRAMUSCULAR
Status: DISCONTINUED | OUTPATIENT
Start: 2017-11-05 | End: 2017-11-06 | Stop reason: HOSPADM

## 2017-11-05 RX ADMIN — SODIUM CHLORIDE 500 ML: 9 INJECTION, SOLUTION INTRAVENOUS at 11:46

## 2017-11-05 RX ADMIN — ATORVASTATIN CALCIUM 10 MG: 10 TABLET, FILM COATED ORAL at 20:06

## 2017-11-05 RX ADMIN — LORAZEPAM 0.5 MG: 0.5 TABLET ORAL at 20:05

## 2017-11-05 RX ADMIN — LORAZEPAM 1 MG: 2 INJECTION INTRAMUSCULAR; INTRAVENOUS at 11:51

## 2017-11-05 RX ADMIN — MIRTAZAPINE 15 MG: 15 TABLET, FILM COATED ORAL at 20:07

## 2017-11-05 ASSESSMENT — ENCOUNTER SYMPTOMS
APPETITE CHANGE: 1
ABDOMINAL PAIN: 1
CONSTIPATION: 0
DIARRHEA: 0
FEVER: 0
NAUSEA: 1
NERVOUS/ANXIOUS: 1
SHORTNESS OF BREATH: 0
VOMITING: 0

## 2017-11-05 NOTE — IP AVS SNAPSHOT
[facility]    [address]    [place] [address]    Phone:  [phone]                                       After Visit Summary   11/5/2017    Des Brooks    MRN: [id number]           After Visit Summary Signature Page     I have received my discharge instructions, and my questions have been answered. I have discussed any challenges I see with this plan with the nurse or doctor.    ..........................................................................................................................................  Patient/Patient Representative Signature      ..........................................................................................................................................  Patient Representative Print Name and Relationship to Patient    ..................................................               ................................................  Date                                            Time    ..........................................................................................................................................  Reviewed by Signature/Title    ...................................................              ..............................................  Date                                                            Time

## 2017-11-05 NOTE — ED NOTES
Patient presents to the ED reporting generalized abdominal spasms since 0000. Patient is tearful and appears very anxious. Frequently crying out and hyperventilating.

## 2017-11-05 NOTE — ED PROVIDER NOTES
"  History     Chief Complaint:  Anxiety and Abdominal Pain    HPI   Des Brooks is a 57 year old female with a history of anxiety, depression, hypertension and hyperlipidemia who presents to the Emergency Department for evaluation of anxiety and abdominal pain. The patient reports the onset of diffuse abdominal pain and anxiety around 12:00 this morning. She did not take any medication prior to arrival. Upon presentation the patient she describes this as \"abdominal cramping\" with associated nausea and anxiety. Of note, the patient was seen on 6/26/17 for evaluation of abdominal pain and anxiety. She reports increased anxiety over the past few days as well as decreased appetite and sleep. The patient states she is looking to start an outpatient program for her anxiety next week as her current anxiety medications have not provided any relief, however, she notes that her anxiety is very severe at this time. The patient denies any exacerbating or alleviating factors. She denies any fever, vomiting, diarrhea, constipation, chest pain, shortness of breath, urinary symptoms, suicidal or homicidal ideations. No history of abdominal surgeries. No active suicide plan.     Cardiac Risk Factors:  History of hypertension - yes  History of hyperlipidemia - yes  History of diabetes - no  History of smoking - no  Family history of heart complications - no    Allergies:  No known drug allergies    Medications:    ATORVASTATIN CALCIUM PO  Sertraline HCl (ZOLOFT PO)  TRAZODONE HCL PO  Alprazolam (XANAX) 1 MG tablet  diazepam (VALIUM) 2 MG tablet  VENLAFAXINE HCL PO  PROMETHAZINE HCL RE  Mirtazapine (REMERON PO)  Lorazepam (ATIVAN) 0.5 MG tablet  LOSARTAN POTASSIUM PO  SIMVASTATIN PO  aspirin 81 MG tablet  ESTERIFIED ESTROGENS PO  PANTOPRAZOLE SODIUM PO    Past Medical History:    Anxiety  Depressive disorder  GERD  Hypercholesterolemia  Hypertension    Past Surgical History:    The patient does not have any pertinent past surgical " history.    Family History:    No past pertinent family history.    Social History:  The patient was accompanied to the ED by .  Smoking Status: never smoker  Alcohol Use: no  Marital Status:   [2]     Review of Systems   Constitutional: Positive for appetite change. Negative for fever.   Respiratory: Negative for shortness of breath.    Cardiovascular: Negative for chest pain.   Gastrointestinal: Positive for abdominal pain and nausea. Negative for constipation, diarrhea and vomiting.   Genitourinary: Negative.    Psychiatric/Behavioral: Negative for suicidal ideas. The patient is nervous/anxious.    All other systems reviewed and are negative.    Physical Exam   First Vitals:  Patient Vitals for the past 24 hrs:   BP Temp Temp src Pulse Resp SpO2   11/05/17 1345 116/57 - - - - 93 %   11/05/17 1330 116/79 - - - - 97 %   11/05/17 1315 129/76 - - - - 94 %   11/05/17 1300 129/71 - - - - 96 %   11/05/17 1245 138/75 - - - - 100 %   11/05/17 1230 143/77 - - - - 99 %   11/05/17 1215 132/71 - - - - -   11/05/17 1200 142/86 - - - - 96 %   11/05/17 1127 (!) 181/100 98.7  F (37.1  C) Oral 95 30 99 %     Physical Exam  Constitutional: The patient is oriented to person, place, and time. Alert and cooperative.  HENT:   Right Ear: External ear normal.   Left Ear: External ear normal.   Nose: Nose normal.   Mouth/Throat: Uvula is midline, oropharynx is clear and moist and mucous membranes are normal. No posterior oropharyngeal edema or erythema.   Eyes: Conjunctivae, EOM and lids are normal. Pupils are equal, round, and reactive to light.   Neck: Trachea normal. Normal range of motion. Neck supple.   Cardiovascular: Normal rate, regular rhythm, normal heart sounds, and intact distal pulses.    Pulmonary/Chest: Effort normal and breath sounds equal bilaterally. No crackles or wheezing.   Abdominal: Soft. No significant tenderness to palpation. No rebound and no guarding.   Musculoskeletal: Normal range of motion.  No  extremity tenderness or edema.  Neurological: Alert and Oriented. Strength 5/5 in upper and lower extremities bilaterally. Sensation intact to light touch throughout.  Skin: Skin is dry. No rash noted.          Emergency Department Course   ECG:  Indication: anxiety  Time: 1152  Vent. Rate 83 bpm. NM interval 144. QRS duration 92. QT/QTc 384/451. P-R-T axis 58 37 52.   normal sinus rhythm. Normal ECG. Read time: 1156.    Laboratory:  CBC: WBC: 6.5, HGB: 13.9, PLT: 417  CMP: Glucose 113(H), o/w WNL (Creat 0.73)  Troponin: <0.015  Lipase: 222  Acetaminophen level: <2  Salicylate level: <2    UA with microscopic: moderate urine blood, pH urine 9.0(H), WBC 6(H), mucous present, squamous epithelial 4(H), many bacteria, o/w WNL.  HCG qualitative urine: negative  Drug abuse screen: positive for benzodiazepine, o/w WNL.    Interventions:  1146 NS 1 L IV  1151 Ativan 1 mg IV    Emergency Department Course:  Nursing notes and vitals reviewed. I performed an exam of the patient as documented above.     Blood drawn. This was sent to the lab for further testing, results above.    EKG obtained in the ED, see results above.     1204 I reassessed the patient.     The patient provided a urine sample here in the emergency department. This was sent for laboratory testing, findings above.    1232 I spoke with DEC regarding this patient.    1301 I reassessed the patient.     1308 I spoke with hospitalist at [place] regarding this patient.    1423 I reassessed the patient.     Findings and plan explained to the Patient. Patient will be transferred to [place] via EMS. Discussed the case with hospitalist, who will admit the patient to a monitored bed for further monitoring, evaluation, and treatment.    Impression & Plan      Medical Decision Making:  Des Brooks is a 57 year old female, with a history of anxiety, who presents to the emergency department for evaluation of anxiety. Upon presentation in the ED, the patient is non-toxic  appearing. She is hypertensive, but vitals are otherwise within normal limits and stable. On exam, she is well appearing. She is alert and with a non focal neurologic exam. Cardiopulmonary exam us unremarkable. Abdomen is soft and not significantly tender to palpation. The rest of her exam is as mentioned above. ECG was obtained and demonstrates sinus rhythm. There are no concerning acute ischemic changes. Labs were obtained and are as mentioned above. The patient has no peritoneal signs on abdominal examination to suggest an acute surgical abdomen or to warrant imaging of the abdomen at this time. The patient does note that these symptoms are typical of her severe anxiety. Following a dose of ativan the patient does note a significant improvement in her symptoms. DEC was consulted to evaluate the patient. They do feel that patient would be best for inpatient management of her anxiety. I discussed this thoroughly with the patient and she is in agreement with this plan. The patient denies any suicidal ideation or suicidal thoughts to me. Her  and the patient note that they would prefer to transfer to [place] by private vehicle. Given that she denies suicidal thoughts, she does not meet criteria for a hold and I do feel that it is reasonable that she travel to [place] by private vehicle.  She was stable/improved at time of transfer.    Diagnosis:    ICD-10-CM    1. Anxiety F41.9 Drug abuse screen 77 urine     Acetaminophen level     Acetaminophen level     Salicylate level     Salicylate level     CANCELED: Acetaminophen level     CANCELED: Salicylate level   2. Depression, unspecified depression type F32.9      Disposition:  Transferred to [place]    Parvin FRANCE am serving as a scribe on 11/5/2017 at 11:34 AM to personally document services performed by Svitlana Rojas MD based on my observations and the provider's statements to me.   Parvin Pereira  11/5/2017   [facility]  EMERGENCY DEPARTMENT       Svitlana Rojas MD  11/06/17 3187

## 2017-11-05 NOTE — ED NOTES
Pt and family member given instructions to [facility] unit [facility].  Pt instructed to drive straight to the unit and check in at the desk.  Transfer paperwork sent with patient.

## 2017-11-05 NOTE — IP AVS SNAPSHOT
MRN:[id number]                      After Visit Summary   11/5/2017    Des Brooks    MRN: [id number]           Thank you!     Thank you for choosing [facility] for your care. Our goal is always to provide you with excellent care.        Patient Information     Date Of Birth          1960        Designated Caregiver       Most Recent Value    Caregiver    Will someone help with your care after discharge? yes    Name of designated caregiver [facility]    Phone number of caregiver [phone]    Caregiver address n/a      About your hospital stay     You were admitted on:  November 5, 2017 You last received care in the:  [address]    You were discharged on:  November 6, 2017       Who to Call     For medical emergencies, please call 911.  For non-urgent questions about your medical care, please call your primary care provider or clinic, [phone]          Attending Provider     Provider Specialty    Alexa Dowling MD Psychiatry       Primary Care Provider Office Phone # Fax #    Thang Arenas [phone]      Further instructions from your care team        Behavioral Discharge Planning and Instructions      Summary:  You were admitted on 11/5/2017  due to Depression.  You were treated by JN Yeboah DNP and discharged on 11/6/2017 from [address] [facility] to Home.      Principal Diagnosis:       Health Care Follow-up Appointments:   Psychiatrist:   Dr Osiel Carballo - Wednesday, November 8th at 8:40 am  [facility]  [address], [place] [address]  Phone: [phone]    Therapist:  Parvin Lopez  (Please call to schedule appointment)  [facility]  [address] [place] [address]  [phone]  Resource:  [facility]  (Call for information about their Intensive Outpatient Program).  [address],   [place] [address]   Phone: [phone]    Recommendations: Switch from single to a dual agent antidepressant,  such as Effexor, Cymbalta, Pristiq. Adding Wellbutrin to augment Zoloft or another SSRI. Taper down and discontinue all benzodiazepines (Xanax, Lorazepam, Klonopin...). Gabapentin might be changed to 3 times a day and increase beyond 300 mg for a single dose.    Attend all scheduled appointments with your outpatient providers. Call at least 24 hours in advance if you need to reschedule an appointment to ensure continued access to your outpatient providers.   Major Treatments, Procedures and Findings:  You were provided with: a psychiatric assessment, assessed for medical stability, medication evaluation and/or management and milieu management    Symptoms to Report: feeling more aggressive, increased confusion, losing more sleep, mood getting worse or thoughts of suicide    Early warning signs can include: increased depression or anxiety sleep disturbances increased thoughts or behaviors of suicide or self-harm  increased unusual thinking, such as paranoia or hearing voices    Safety and Wellness:  Take all medicines as directed.  Make no changes unless your doctor suggests them.      Follow treatment recommendations.  Refrain from alcohol and non-prescribed drugs.  If there is a concern for safety, call [phone].    Resources:   Crisis Intervention: 780.294.6049 or 825-230-5108 (TTY: 495.117.4150).  Call anytime for help.  National [place] on Mental Illness (www.[place].saray.org): 437.356.3653 or 922-232-9227.  Suicide Awareness Voices of Education (SAVE) (www.save.org): 618-741-ANUB (2183)  National Suicide Prevention Line (www.mentalhealthmn.org): 960-336-VDUS (2497)  Mental Health Consumer/Survivor Network of [place] (www.mhcsn.net): 679.633.9023 or 995-355-3317  Mental Health Association of [place] (www.mentalhealth.org): 340.911.8103 or 484-232-2189  [facility] Crisis Response [phone]    The treatment team has appreciated the opportunity to work with you.     If you have any questions or concerns our unit number is [phone]  [phone].  You may be receiving a follow-up phone call within the next three days from a representative from behavioral health.    You have identified the best phone number to reach you as [phone].        Pending Results     Date and Time Order Name Status Description    2017 1046 Urine Culture Aerobic Bacterial Preliminary             Admission Information     Date & Time Provider Department Dept. Phone    2017 Alexa Dowling MD UR 3BFH [phone]      Your Vitals Were     Blood Pressure Pulse Temperature Respirations Weight BMI (Body Mass Index)    114/67 70 97.8  F (36.6  C) (Tympanic) 16 73.4 kg (161 lb 12.8 oz) 24.6 kg/m2      [employer] Information     9You lets you send messages to your doctor, view your test results, renew your prescriptions, schedule appointments and more. To sign up, go to [email or web address] . Click on \"Log in\" on the left side of the screen, which will take you to the Welcome page. Then click on \"Sign up Now\" on the right side of the page.     You will be asked to enter the access code listed below, as well as some personal information. Please follow the directions to create your username and password.     Your access code is: [email or web address]  Expires: 2018  1:40 PM     Your access code will  in 90 days. If you need help or a new code, please call your [facility] clinic or [phone].        Care EveryWhere ID     This is your Care EveryWhere ID. This could be used by other organizations to access your [facility] medical records  [id number]        Equal Access to Services     [facility] : Hadenrique Borja, dilia yousif, shey larsen. So [facility] [phone].    ATENCIÓN: Si habla español, tiene a gibson disposición servicios gratuitos de asistencia lingüística. Llame al [phone].    We comply with applicable federal civil rights laws and [place] laws. We do not discriminate on the " basis of race, color, national origin, age, disability, sex, sexual orientation, or gender identity.               Review of your medicines      CONTINUE these medicines which may have CHANGED, or have new prescriptions. If we are uncertain of the size of tablets/capsules you have at home, strength may be listed as something that might have changed.        Dose / Directions    ALPRAZolam 1 MG tablet   Commonly known as:  XANAX   This may have changed:    - how much to take  - how to take this  - when to take this  - additional instructions        Take 0.5mg to 1mg every 8-12 hrs as needed   Quantity:  15 tablet   Refills:  0         CONTINUE these medicines which have NOT CHANGED        Dose / Directions    aspirin 81 MG tablet        Dose:  81 mg   Take 81 mg by mouth daily   Refills:  0       ATORVASTATIN CALCIUM PO   Indication:  high cholesterol        Dose:  10 mg   Take 10 mg by mouth every evening   Refills:  0       LOSARTAN POTASSIUM PO   Indication:  High Blood Pressure Disorder        Dose:  25 mg   Take 25 mg by mouth daily   Refills:  0       MAGNESIUM OXIDE PO   Indication:  Pt takes as a supplement for brain and nerve function        Dose:  400 mg   Take 400 mg by mouth 3 times daily   Refills:  0       OMEGA 3 PO        Refills:  0       OMEPRAZOLE PO        Dose:  20 mg   Take 20 mg by mouth 2 times daily (before meals)   Refills:  0       promethazine 25 MG tablet   Commonly known as:  PHENERGAN        Take by mouth every 6 hours as needed for nausea   Refills:  0       REMERON PO   Indication:  Major Depressive Disorder        Dose:  7.5-15 mg   Take 7.5-15 mg by mouth 3 times daily Take 7.5mg in the morning, 7.5mg in the afternoon and 15mg at bedtime.   Refills:  0       SUPER B COMPLEX PO        Refills:  0       TRAZODONE HCL PO   Indication:  Anxiety Disorder, Pt takes 25 mg in afternoon and 25 mg at bedtime prn        Dose:  25-50 mg   Take 25-50 mg by mouth nightly as needed for sleep    Refills:  0       VITAMIN D (CHOLECALCIFEROL) PO        Dose:  1000 Units   Take 1,000 Units by mouth daily   Refills:  0       ZOLOFT PO   Indication:  Major Depressive Disorder        Dose:  50 mg   Take 50 mg by mouth 2 times daily   Refills:  0                Protect others around you: Learn how to safely use, store and throw away your medicines at www.disposemymeds.org.             Medication List: This is a list of all your medications and when to take them. Check marks below indicate your daily home schedule. Keep this list as a reference.      Medications           Morning Afternoon Evening Bedtime As Needed    ALPRAZolam 1 MG tablet   Commonly known as:  XANAX   Take 0.5mg to 1mg every 8-12 hrs as needed                                aspirin 81 MG tablet   Take 81 mg by mouth daily                                ATORVASTATIN CALCIUM PO   Take 10 mg by mouth every evening   Last time this was given:  10 mg on 11/5/2017  8:06 PM                                LOSARTAN POTASSIUM PO   Take 25 mg by mouth daily   Last time this was given:  25 mg on 11/6/2017  8:11 AM                                MAGNESIUM OXIDE PO   Take 400 mg by mouth 3 times daily   Last time this was given:  400 mg on 11/6/2017  8:11 AM                                OMEGA 3 PO                                OMEPRAZOLE PO   Take 20 mg by mouth 2 times daily (before meals)   Last time this was given:  20 mg on 11/6/2017  6:44 AM                                promethazine 25 MG tablet   Commonly known as:  PHENERGAN   Take by mouth every 6 hours as needed for nausea                                REMERON PO   Take 7.5-15 mg by mouth 3 times daily Take 7.5mg in the morning, 7.5mg in the afternoon and 15mg at bedtime.   Last time this was given:  7.5 mg on 11/6/2017  8:11 AM                                SUPER B COMPLEX PO                                TRAZODONE HCL PO   Take 25-50 mg by mouth nightly as needed for sleep                                 VITAMIN D (CHOLECALCIFEROL) PO   Take 1,000 Units by mouth daily                                ZOLOFT PO   Take 50 mg by mouth 2 times daily   Last time this was given:  50 mg on 11/6/2017  8:11 AM

## 2017-11-05 NOTE — ED NOTES
"Pain resolved after Ativan given.  \"pain vanished\"  Nausea gone as well.  Resting comfortably.  Calm and cooperative.   "

## 2017-11-06 VITALS
SYSTOLIC BLOOD PRESSURE: 114 MMHG | RESPIRATION RATE: 16 BRPM | DIASTOLIC BLOOD PRESSURE: 67 MMHG | HEART RATE: 70 BPM | TEMPERATURE: 97.8 F | BODY MASS INDEX: 24.6 KG/M2 | WEIGHT: 161.8 LBS

## 2017-11-06 LAB
ALBUMIN SERPL-MCNC: 3.7 G/DL (ref 3.4–5)
ALP SERPL-CCNC: 90 U/L (ref 40–150)
ALT SERPL W P-5'-P-CCNC: 27 U/L (ref 0–50)
ANION GAP SERPL CALCULATED.3IONS-SCNC: 8 MMOL/L (ref 3–14)
AST SERPL W P-5'-P-CCNC: 19 U/L (ref 0–45)
BASOPHILS # BLD AUTO: 0 10E9/L (ref 0–0.2)
BASOPHILS NFR BLD AUTO: 0.5 %
BILIRUB SERPL-MCNC: 0.4 MG/DL (ref 0.2–1.3)
BUN SERPL-MCNC: 15 MG/DL (ref 7–30)
CALCIUM SERPL-MCNC: 8.9 MG/DL (ref 8.5–10.1)
CHLORIDE SERPL-SCNC: 107 MMOL/L (ref 94–109)
CO2 SERPL-SCNC: 28 MMOL/L (ref 20–32)
CREAT SERPL-MCNC: 0.76 MG/DL (ref 0.52–1.04)
DEPRECATED CALCIDIOL+CALCIFEROL SERPL-MC: 46 UG/L (ref 20–75)
DIFFERENTIAL METHOD BLD: NORMAL
EOSINOPHIL # BLD AUTO: 0.3 10E9/L (ref 0–0.7)
EOSINOPHIL NFR BLD AUTO: 4 %
ERYTHROCYTE [DISTWIDTH] IN BLOOD BY AUTOMATED COUNT: 14.2 % (ref 10–15)
FOLATE SERPL-MCNC: 32.4 NG/ML
GFR SERPL CREATININE-BSD FRML MDRD: 78 ML/MIN/1.7M2
GLUCOSE SERPL-MCNC: 103 MG/DL (ref 70–99)
HCT VFR BLD AUTO: 43.1 % (ref 35–47)
HGB BLD-MCNC: 14 G/DL (ref 11.7–15.7)
IMM GRANULOCYTES # BLD: 0 10E9/L (ref 0–0.4)
IMM GRANULOCYTES NFR BLD: 0.4 %
INTERPRETATION ECG - MUSE: NORMAL
LYMPHOCYTES # BLD AUTO: 2.6 10E9/L (ref 0.8–5.3)
LYMPHOCYTES NFR BLD AUTO: 31.7 %
MCH RBC QN AUTO: 28.5 PG (ref 26.5–33)
MCHC RBC AUTO-ENTMCNC: 32.5 G/DL (ref 31.5–36.5)
MCV RBC AUTO: 88 FL (ref 78–100)
MONOCYTES # BLD AUTO: 0.7 10E9/L (ref 0–1.3)
MONOCYTES NFR BLD AUTO: 8.5 %
NEUTROPHILS # BLD AUTO: 4.5 10E9/L (ref 1.6–8.3)
NEUTROPHILS NFR BLD AUTO: 54.9 %
NRBC # BLD AUTO: 0 10*3/UL
NRBC BLD AUTO-RTO: 0 /100
PLATELET # BLD AUTO: 440 10E9/L (ref 150–450)
POTASSIUM SERPL-SCNC: 4.4 MMOL/L (ref 3.4–5.3)
PROT SERPL-MCNC: 7.9 G/DL (ref 6.8–8.8)
RBC # BLD AUTO: 4.91 10E12/L (ref 3.8–5.2)
SODIUM SERPL-SCNC: 143 MMOL/L (ref 133–144)
TSH SERPL DL<=0.005 MIU/L-ACNC: 3.66 MU/L (ref 0.4–4)
VIT B12 SERPL-MCNC: 751 PG/ML (ref 193–986)
WBC # BLD AUTO: 8.2 10E9/L (ref 4–11)

## 2017-11-06 PROCEDURE — 99232 SBSQ HOSP IP/OBS MODERATE 35: CPT | Performed by: PHYSICIAN ASSISTANT

## 2017-11-06 PROCEDURE — 82746 ASSAY OF FOLIC ACID SERUM: CPT | Performed by: PSYCHIATRY & NEUROLOGY

## 2017-11-06 PROCEDURE — 80053 COMPREHEN METABOLIC PANEL: CPT | Performed by: PSYCHIATRY & NEUROLOGY

## 2017-11-06 PROCEDURE — 97150 GROUP THERAPEUTIC PROCEDURES: CPT | Mod: GO

## 2017-11-06 PROCEDURE — 36415 COLL VENOUS BLD VENIPUNCTURE: CPT | Performed by: PSYCHIATRY & NEUROLOGY

## 2017-11-06 PROCEDURE — 82306 VITAMIN D 25 HYDROXY: CPT | Performed by: PSYCHIATRY & NEUROLOGY

## 2017-11-06 PROCEDURE — 82607 VITAMIN B-12: CPT | Performed by: PSYCHIATRY & NEUROLOGY

## 2017-11-06 PROCEDURE — 85025 COMPLETE CBC W/AUTO DIFF WBC: CPT | Performed by: PSYCHIATRY & NEUROLOGY

## 2017-11-06 PROCEDURE — 25000132 ZZH RX MED GY IP 250 OP 250 PS 637: Performed by: PSYCHIATRY & NEUROLOGY

## 2017-11-06 PROCEDURE — 84443 ASSAY THYROID STIM HORMONE: CPT | Performed by: PSYCHIATRY & NEUROLOGY

## 2017-11-06 PROCEDURE — 99235 HOSP IP/OBS SAME DATE MOD 70: CPT | Performed by: NURSE PRACTITIONER

## 2017-11-06 RX ORDER — GABAPENTIN 300 MG/1
300 CAPSULE ORAL 3 TIMES DAILY PRN
Status: DISCONTINUED | OUTPATIENT
Start: 2017-11-06 | End: 2017-11-06 | Stop reason: HOSPADM

## 2017-11-06 RX ORDER — PHENOBARBITAL 32.4 MG/1
32.4 TABLET ORAL 3 TIMES DAILY
Status: DISCONTINUED | OUTPATIENT
Start: 2017-11-06 | End: 2017-11-06 | Stop reason: HOSPADM

## 2017-11-06 RX ADMIN — MIRTAZAPINE 7.5 MG: 7.5 TABLET, FILM COATED ORAL at 08:11

## 2017-11-06 RX ADMIN — B-COMPLEX W/ C & FOLIC ACID TAB 1 TABLET: TAB at 08:11

## 2017-11-06 RX ADMIN — SERTRALINE HYDROCHLORIDE 50 MG: 50 TABLET ORAL at 08:11

## 2017-11-06 RX ADMIN — MAGNESIUM OXIDE TAB 400 MG (241.3 MG ELEMENTAL MG) 400 MG: 400 (241.3 MG) TAB at 08:11

## 2017-11-06 RX ADMIN — LOSARTAN POTASSIUM 25 MG: 25 TABLET, FILM COATED ORAL at 08:11

## 2017-11-06 RX ADMIN — OMEPRAZOLE 20 MG: 20 CAPSULE, DELAYED RELEASE ORAL at 06:44

## 2017-11-06 RX ADMIN — LORAZEPAM 0.5 MG: 0.5 TABLET ORAL at 08:12

## 2017-11-06 RX ADMIN — ASPIRIN 81 MG: 81 TABLET, COATED ORAL at 08:11

## 2017-11-06 ASSESSMENT — ACTIVITIES OF DAILY LIVING (ADL)
DRESS: INDEPENDENT
GROOMING: INDEPENDENT
ORAL_HYGIENE: INDEPENDENT
DRESS: INDEPENDENT
LAUNDRY: WITH SUPERVISION
ORAL_HYGIENE: INDEPENDENT
GROOMING: INDEPENDENT

## 2017-11-06 NOTE — PROGRESS NOTES
[facility] Date: 17  Query Report Page#: 1  Patient Rx History Report  AMANDA BENNETT  Search Criteria: Last Name 'amanda' and First Name 'diana' and  = ' and Request Period = ' to  ' - 3 out of 3 Recipients Selected.  Fill Date Product, Str, Form Qty Days Pt ID Prescriber Written RX# N/R* Pharm **MED+  ---------- -------------------------------- ------ ---- --------- ---------- ---------- ------------ ----- --------- ------  10/18/2017 GABAPENTIN 300 MG CAPSULE 60.00 30 [id number] 2016 [id number] R [id number] 00.0  10/10/2017 ALPRAZOLAM 0.5 MG TABLET 120.00 30 [id number] 10/10/2017 [id number] N [id number] 00.0  2017 ALPRAZOLAM 0.5 MG TABLET 90.00 30 [id number] 2017 [id number] N [id number] 00.0  2017 ALPRAZOLAM 0.5 MG TABLET 60.00 30 [id number] 2017 [id number] N [id number] 00.0  2017 ALPRAZOLAM 1 MG TABLET 10.00 10 [id number] 2017 [id number] N [id number] 00.0  2017 DIAZEPAM 2 MG TABLET 90.00 30 [id number] 2017 [id number] N [id number] 00.0  2017 DIAZEPAM 2 MG TABLET 60.00 30 [id number] 2017 [id number] N [id number] 00.0  2017 DIAZEPAM 2 MG TABLET 20.00 10 [id number] 2017 [id number] N [id number] 00.0  2017 ALPRAZOLAM 1 MG TABLET 15.00 5 [id number] 2017 [id number] N [id number] 00.0  2017 GABAPENTIN 300 MG CAPSULE 60.00 30 [id number] 2016 [id number] R [id number] 00.0  2017 ALPRAZOLAM 0.25 MG TABLET 90.00 30 [id number] 2017 [id number] N [id number] 00.0  2017 LORAZEPAM 0.5 MG TABLET 4.00 1 [id number] 2017 [id number] N [id number] 00.0  2017 GABAPENTIN 300 MG CAPSULE 60.00 30 [id number] 2016 [id number] R [id number] 00.0  2017 GABAPENTIN 300 MG CAPSULE 60.00 30 [id number] 2016 [id number] R [id number] 00.0  2017 HYDROCODON-ACETAMIN 7.5-325/15 400.00 6 [id number]  03/02/2017 [id number] N [id number] UNK  03/03/2017 GABAPENTIN 300 MG CAPSULE 60.00 30 [id number] 11/04/2016 [id number] R [id number] 00.0  02/28/2017 HYDROCODON-ACETAMIN 7.5-325/15 200.00 4 [id number] 02/28/2017 [id number] N [id number] 25.0  02/07/2017 DIAZEPAM 5 MG TABLET 40.00 20 [id number] 02/07/2017 [id number] N [id number] 00.0  02/06/2017 VIRTUSSIN AC LIQUID 120.00 4 [id number] 02/06/2017 [id number] N [id number] 00.0  02/02/2017 GABAPENTIN 300 MG CAPSULE 60.00 30 [id number] 11/04/2016 [id number] R [id number] 00.0  01/10/2017 LORAZEPAM 0.5 MG TABLET 20.00 5 [id number] 01/06/2017 [id number] N [id number] 00.0  01/03/2017 GABAPENTIN 300 MG CAPSULE 60.00 30 [id number] 11/04/2016 [id number] R [id number] 00.0  12/06/2016 GABAPENTIN 300 MG CAPSULE 60.00 30 [id number] 11/04/2016 [id number] R [id number] 00.0  *N/R N=New R=Refill  +MED Daily  Prescribers for prescriptions listed  ----------------------------------------------------------------------------------------------------------------------------------  [id number] ANDREW MORGAN MD; [address] [place] [address]  [id number] OMI MARTINEZ MD; [facility], URGENT CARE DEPT. [facility], [address], [place]  [id number] PAVEL BAUER (MD); [facility], [address],, [place] [address]  [id number] BENJA BOATENG P (MD); [facility] [address] [place] [address]  [id number] MICHAEL RUSSELL MD; [facility], [address], [place] [address]  [id number] SANIA CONTRERAS MD; [facility] [address] [place] [address]  [id number] DIANE PETTIT MD; [facility], [address] [place] [address]  [id number] RASTA ABAD MD; [address], [place] [address]    Pharmacies that dispensed prescriptions  listed  ----------------------------------------------------------------------------------------------------------------------------------  [id number] [facility] : [facility] [id number], [address] [place] [address] [address] [place] [address],  [id number] [facility]; [address] [place] [address],  Patients that match search criteria  ----------------------------------------------------------------------------------------------------------------------------------  [id number] AMANDA RUELASLAMINE,  58;  [address] [place] [address], [facility] [address]  [id number] AMANDA JESSENIALAMINE,  58;  [address] [place] [address] , [facility] [address]  [id number] AMANDA JESSENIALAMINE,  58; , [facility] [address]

## 2017-11-06 NOTE — PLAN OF CARE
Problem: Anxiety (Adult)  Goal: Identify Related Risk Factors and Signs and Symptoms  Related risk factors and signs and symptoms are identified upon initiation of Human Response Clinical Practice Guideline (CPG).   Patient spent all shift in the milleu. She endorses anxiety and restlessness. She denies SI/SIB and is looking forward to discharging this evening.

## 2017-11-06 NOTE — CONSULTS
"  Internal Medicine Initial Visit    Des Brooks MRN# [id number]   Age: 57 year old YOB: 1960   Date of Admission:       11/5/2017     Reason for consult: HTN, HLD, GERD, Chronic Nausea       Requesting physician Dr. Noble      SUBJECTIVE   HPI:   Des Brooks is a 57 year old female with a history of hypertension, hyperlipidemia, GERD, depression, and anxiety who was admitted to inpatient behavioral health ([facility]) on 11/5/17 for worsening depression and anxiety with suicidal ideation. Internal Medicine was consulted for evaluation of HTN, HLD, GERD, and chronic nausea.     Ms. Brooks reports she has chronic nausea related to her anxiety which is well managed with Phenergan PRN as an outpatient. She had generalized abdominal pain and cramping in the ED on 11/5 which she attributes to anxiety. Lipase and LFTs were normal in the ED. Abdominal pain resolved after Ativan without further recurrence. No recent emesis, constipation, or diarrhea. She also reports chronic headaches and intermittent \"chest tightness\" which are related to her anxiety and not currently present; Troponin <0.015 in the ED. She has had an intermittent rash behind her left ear for the past 4 months which is resolving without intervention; she previously tried Hydrocortisone cream without improvement. Denies fevers, chills, dizziness, rhinorrhea, sore throat, cough, SOB, dysuria, or peripheral edema.     Past Medical History:   Hypertension  Hyperlipidemia  GERD  Depression  Anxiety  Chronic Nausea     Past Surgical History:   Tonsillectomy     Medications prior to admission:     Prior to Admission Medications   Prescriptions Last Dose Informant Patient Reported? Taking?   ALPRAZolam (XANAX) 1 MG tablet 11/4/2017 at 2200  No Yes   Sig: Take 0.5mg to 1mg every 8-12 hrs as needed   Patient taking differently: Take 0.5 mg by mouth 3 times daily    ATORVASTATIN CALCIUM PO 11/4/2017 at 2000 Self Yes Yes   Sig: Take 10 mg by mouth every " evening    B Complex-C (SUPER B COMPLEX PO)   Yes Yes   LOSARTAN POTASSIUM PO 11/5/2017 at 1430 Self Yes Yes   Sig: Take 25 mg by mouth daily    MAGNESIUM OXIDE PO 11/4/2017 at unknown  Yes Yes   Sig: Take 400 mg by mouth 3 times daily   Mirtazapine (REMERON PO) 11/5/2017 at 1430 Self Yes Yes   Sig: Take 7.5-15 mg by mouth 3 times daily Take 7.5mg in the morning, 7.5mg in the afternoon and 15mg at bedtime.   OMEPRAZOLE PO   Yes Yes   Sig: Take 20 mg by mouth 2 times daily (before meals)   Omega-3 Fatty Acids (OMEGA 3 PO)   Yes Yes   Sertraline HCl (ZOLOFT PO) 11/4/2017 at 1800 Self Yes Yes   Sig: Take 50 mg by mouth 2 times daily    TRAZODONE HCL PO  Self Yes Yes   Sig: Take 25-50 mg by mouth nightly as needed for sleep    VITAMIN D, CHOLECALCIFEROL, PO   Yes Yes   Sig: Take 1,000 Units by mouth daily   aspirin 81 MG tablet 11/4/2017 at 1800  Yes Yes   Sig: Take 81 mg by mouth daily    promethazine (PHENERGAN) 25 MG tablet   Yes Yes   Sig: Take by mouth every 6 hours as needed for nausea      Facility-Administered Medications: None         Allergies:   No Known Allergies      Social History:   Denies any tobacco, alcohol, or illicit drug use.     Family History:   Reviewed and significant for:  - Mother with history of CAD and Diabetes  - Father with history of CAD and CVA     Review of Systems:   Ten point review of systems negative except as stated above in History of Present Illness.    OBJECTIVE   Physical Exam:   Blood pressure 133/86, pulse 80, temperature 99.3  F (37.4  C), temperature source Tympanic, resp. rate 16, weight 73.4 kg (161 lb 12.8 oz).  General: Awake. Non-toxic appearing. NAD.  HEENT: NC/AT. Anicteric sclera. Eyes symmetric and free of discharge bilaterally. Mucous membranes moist.  Neck: Supple  Cardiovascular: RRR. S1,S2. No murmurs appreciated.  Lungs: Normal respiratory effort on RA. Lungs CTAB without wheezes, rales, or rhonchi.  Abdomen: Soft, non-tender, and nondistended with bowel  sounds present.  Extremities: Warm & well perfused. No peripheral edema.  Skin: Healing hyperpigmented papular lesions behind the left ear; no erythema or drainage. No jaundice.  Neurologic: A&O X 3. Answers questions appropriately. Moves all extremities symmetrically.     Laboratory Data:     CMP  Recent Labs  Lab 11/06/17  0740 11/05/17  1141    140   POTASSIUM 4.4 3.8   CHLORIDE 107 106   CO2 28 26   ANIONGAP 8 8   * 113*   BUN 15 18   CR 0.76 0.73   GFRESTIMATED 78 82   GFRESTBLACK >90 >90   RACHELLE 8.9 8.8   PROTTOTAL 7.9 7.8   ALBUMIN 3.7 3.5   BILITOTAL 0.4 0.2   ALKPHOS 90 84   AST 19 15   ALT 27 25       CBC  Recent Labs  Lab 11/06/17  0740 11/05/17  1141   WBC 8.2 6.5   RBC 4.91 4.96   HGB 14.0 13.9   HCT 43.1 43.0   MCV 88 87   MCH 28.5 28.0   MCHC 32.5 32.3   RDW 14.2 14.1    417       TSH  Recent Labs  Lab 11/06/17  0740   TSH 3.66          Assessment and Plan/Recommendations:   Des Brooks is a 57 year old female with a history of hypertension, hyperlipidemia, GERD, depression, and anxiety admitted to  for worsening depression and anxiety with suicidal ideation.    Suicidal Ideation, Depression, Anxiety - Management per Psychiatry.    Atypical Chest Pain - Intermittent chest tightness associated with anxiety. EKG (11/5) showed normal sinus rhythm without evidence of ischemia. Troponin <0.015 on 11/5. DDx includes anxiety-related vs uncontrolled GERD vs other.  - Management of anxiety as above and GERD as below  - Continue to monitor. Notify Medicine if recurrent chest pain or hemodynamic instability occurs.  - Follow up with PCP for further evaluation as an outpatient if persists after improvement in anxiety    Abnormal UA - UA (11/5/17) with moderate blood, small LE, 6 WBC, and 4 squamous cells. No urinary symptoms.  - Add on Urine Culture. Defer antibiotics until resulted given asymptomatic, afebrile, and without leukocytosis.    Hypertension - Hypertensive to 181/100 on ED  presentation, otherwise BP has remained normotensive.  - Continue PTA Losartan 25 mg QD with hold parameters.    Hyperlipidemia - Last lipid panel (5/19/17) with Total Cholesterol 228 (H), Triglycerides 184 (H), HDL 57 (WNL),  (H).  - Continue PTA regimen of Aspirin 81 mg QD and Atorvastatin 10 mg q evening.    GERD - Continue PTA Omeprazole 20 mg BID.    Chronic Nausea - Related to anxiety per patient. No vomiting, constipation, or diarrhea. Complained of abdominal pain on ED presentation w/normal lipase, LFTs, and WBC count; pain resolved after Ativan without recurrence. Abdominal exam benign.   - Continue PTA Phenergan 25 mg q 6h PRN for nausea    Left Posterior Ear Rash - Intermittent over past 4 months. Hydrocortisone cream ineffective. No new lotions, soaps, hair products, etc. Appears to be resolving. Unclear etiology, most likely ddx includes dermatitis (contact vs irritant) vs folliculitis.  - Continue to monitor. Notify Medicine if concerns for recurrence.    Medicine will follow up on Urine Cx results. Please page the Internal Medicine job code pager for any intercurrent medical issues which arise.     Rylee Everett PA-C  Hospitalist Service  [phone]

## 2017-11-06 NOTE — DISCHARGE INSTRUCTIONS
Behavioral Discharge Planning and Instructions      Summary:  You were admitted on 11/5/2017  due to Depression.  You were treated by JN Yeboah DNP and discharged on 11/6/2017 from Unit 3BW to Home.      Principal Diagnosis:       Health Care Follow-up Appointments:   Psychiatrist:   Dr Osiel Carballo - Wednesday, November 8th at 8:40 am  [facility]  [address] [facility] [address], [place] [address]  Phone: [phone]    Therapist:  Parvin Lopez  (Please call to schedule appointment)  [facility]  [address] [place] [address] [phone]  Resource:  [facility]  (Call for information about their Intensive Outpatient Program).  [address],   [place] [address]   Phone: [phone]    Recommendations: Switch from single to a dual agent antidepressant, such as Effexor, Cymbalta, Pristiq. Adding Wellbutrin to augment Zoloft or another SSRI. Taper down and discontinue all benzodiazepines (Xanax, Lorazepam, Klonopin...). Gabapentin might be changed to 3 times a day and increase beyond 300 mg for a single dose.    Attend all scheduled appointments with your outpatient providers. Call at least 24 hours in advance if you need to reschedule an appointment to ensure continued access to your outpatient providers.   Major Treatments, Procedures and Findings:  You were provided with: a psychiatric assessment, assessed for medical stability, medication evaluation and/or management and milieu management    Symptoms to Report: feeling more aggressive, increased confusion, losing more sleep, mood getting worse or thoughts of suicide    Early warning signs can include: increased depression or anxiety sleep disturbances increased thoughts or behaviors of suicide or self-harm  increased unusual thinking, such as paranoia or hearing voices    Safety and Wellness:  Take all medicines as directed.  Make no changes unless your doctor suggests them.      Follow  treatment recommendations.  Refrain from alcohol and non-prescribed drugs.  If there is a concern for safety, call 911.    Resources:   Crisis Intervention: 909.482.7538 or 223-180-4645 (TTY: 656.334.8998).  Call anytime for help.  National [place] on Mental Illness (www.[place].saray.org): 367.110.4448 or 836-338-4977.  Suicide Awareness Voices of Education (SAVE) (www.save.org): 475-299-XETF (3793)  National Suicide Prevention Line (www.mentalhealthmn.org): 804-310-QKFH (5256)  Mental Health Consumer/Survivor Network of [place] (www.mhcsn.net): 491.577.4635 or 438-035-9347  Mental Health Association of [place] (www.mentalhealth.org): 808.568.2960 or 429-269-7387  [facility] Crisis Response [phone]    The treatment team has appreciated the opportunity to work with you.     If you have any questions or concerns our unit number is [phone].  You may be receiving a follow-up phone call within the next three days from a representative from behavioral health.    You have identified the best phone number to reach you as [phone].

## 2017-11-06 NOTE — PROGRESS NOTES
INITIAL PSYCHOSOCIAL ASSESSMENT AND NOTE  I have reviewed the chart met with the patient, and developed Care Plan.  Information for assessment was obtained from: pt and chart     Presenting Problem:    Pt is a 57 year old female who presents to [facility] ED. Pt presents with suicidal ideation without a plan.  She reports that she can no longer go on, worsening depression and anxiety.  Pt reports that she is having daily panic attacks, crying bouts and eating maybe once per day.  She reports that she is unable to use her coping skills.  Pt has a decline in her functioning, anhedonic, isolating self and flat affect.  Pt was seen in the ED 5 months ago for anxiety and she reports that symptoms have become manageable.  Living Situation:      Pt lives in senior housing with her  in [place].    Family Description (Constellation):     Pt has spouse and two brothers and sisters who live in [place].    History of Mental Health and Chemical Dependency:    Pt reports a history of anxiety disorder and major depressive disorder. Pt has been seen in the ED several times, however, this is her first  hospitalization. No history of drug or alcohol abuse.    Family Psychiatric History:    No history of mental health problems in the family.     Ethnic/Cultural Issues:     Pt is from [place].  She has lived in the  for 8 years.     Spiritual Orientation    None reported.     Significant Life Events (Illness, Abuse, Trauma, Death):  Death of parents.      Service History:   None.      Occupational History:     Pt worked at [employer] for 12 years. She was also an  for [address] [place] [address].  Pt is not currently employed.     Educational Background:       Four year degree in Amharic literature.     Financial Status:         Pt s  receives social security.     Legal Issues:         None identified.     Current Treatment Providers are:  Psychiatrist:   Dr Bruce Meyer [facility]  [address] [place] [address]  Phone: [phone]  Therapist:  Sarah Prescher [facility]  [address]  [place] [address]  [phone]     Social Service Assessment/Plan:    Met with pt for assessment. She was pleasant and calm.  She was focused on her medications  and requested provider to change them for her. When explained that provider could not do that due to her leaving today, she requested that provider write a recommendation change for her to give to her outside provider. Pt reports that her  will be here at 4pm today for discharge.    Patient will have psychiatric assessment and medication management by the psychiatrist. Medications will be reviewed and adjusted per MD as indicated. The treatment team will continue to assess and stabilize the patient's mental health symptoms with the use of medications and therapeutic programming. Hospital staff will provide a safe environment and a therapeutic milieu. Staff will continue to assess patient as needed. Patient will participate in unit groups and activities. Patient will receive individual and group support on the unit.  CTC will do individual inpatient treatment planning and after care planning. CTC will discuss options for increasing community supports with the patient. CTC will coordinate with outpatient providers and will place referrals to ensure appropriate follow up care is in place.

## 2017-11-06 NOTE — PLAN OF CARE
Problem: Psychotic Symptoms  Goal: Psychotic Symptoms  Signs and symptoms of listed problems will be absent or manageable.      Pt attended .5/2 OT groups. She filled out OT goal sheet, wrote reason for admission as  to learn how can I manage my anxiety  and did not identify her goals. She was bright with smiles and pleasant on approach. Pt will be given and completed a written self assessment. OT staff will orient pt to OT program, discussed the value of being involved in their treatment plan, and provided options to meet their current needs/self identified goals. Needs further assessment for initial eval.

## 2017-11-06 NOTE — PROGRESS NOTES
11/05/17 1834   Patient Belongings   Patient Belongings cell phone/electronics;glasses;laptop computer;purse   Disposition of Belongings Other (see comment);Kept with patient   Belongings Search Yes   Clothing Search Yes   Second Staff [facility]     In pt locker:  Black laptop bag with laptop,   Cell phone (red)    [place] blue large purse  [place] ID  Jacket  Scarf    Given to pt:  Shoes  Undergarments  Pants  t shirt  Books  Glasses    *Pt had no credit cards or cash upon admission    A               Admission:  I am responsible for any personal items that are not sent to the safe or pharmacy.  [facility] is not responsible for loss, theft or damage of any property in my possession.    Patient Signature:  _____________________________ Date: _______  Time: _____                                              Staff Signature:  ____________________________ Date: ________  Time: _____      2nd Staff person, if patient is unable/unwilling to sign:    Signature: ________________________________ Date: ________  Time: _____     Discharge:  [facility] has returned all of my personal belongings:    Signature: _________________________________ Date: ________  Time: _____                                          Staff Signature:  ____________________________ Date: ________  Time: _____

## 2017-11-06 NOTE — PLAN OF CARE
Problem: Anxiety (Adult)  Intervention: Promote Anxiety Reduction/Resolution        Reasons you are in the hospital:  1)  I had a crisis  2)  I was anxious     Goals for Discharge:  1)  I want to discharge today

## 2017-11-06 NOTE — PLAN OF CARE
Problem: Psychotic Symptoms  Goal: Psychotic Symptoms  Signs and symptoms of listed problems will be absent or manageable.   Outcome: Adequate for Discharge Date Met:  11/06/17  Pt requesting discharge.  She feels stable and denies SI.  Also requests that she have d/c summary mailed to her, because she wants to follow up with some of the recommendations from the NP who interviewed her.  No med changes were made.  Declined Phenobarbital.  Discharged to home with  transporting and all belongings.

## 2017-11-06 NOTE — PLAN OF CARE
"Problem: Anxiety (Adult)  Goal: Identify Related Risk Factors and Signs and Symptoms  Related risk factors and signs and symptoms are identified upon initiation of Human Response Clinical Practice Guideline (CPG).  Outcome: No Change  Pt arrived on the unit (3B) from [facility] at aprox 1645.  Pt transported here by her .   present during admission interview.  Pt reported daily panic attacks, with anxiety increasing throughout the day and with sx becoming unmanageable.   reports pt sleeps until 12 noon and doesn't take her medications until later in the day.  reported that she does not sleep at noc, only falls asleep once the sun comes up and sleeps for about 4 hours or so.  Pt is on her computer or watching TV at noc.  Pt reports that she becomes out of control and screams and cries.  Pt denies SI/SIB, depression or hallucinations.  Pt affect is flat, mood calm.  Pt reports being calm because she received Ativan in ER.  Pt stated. \"Oh, my anxiety will start again in the morning\".  Pt refused dinner, except for a banana.  Med compliant, except refused Zoloft because it was generic brand and did not want Magnesium tonight.  Pt out in the lounge reading books from home.  Denies pain.  Safety search completed. VS stable. 15 min checks initiated. Treatment plan initiated. Brief orientation to unit provided. See patient profile.      "

## 2017-11-07 LAB
BACTERIA SPEC CULT: NORMAL
Lab: NORMAL
SPECIMEN SOURCE: NORMAL

## 2017-11-07 NOTE — DISCHARGE SUMMARY
"COMBINATION DISCHARGE SUMMARY/ADMISSION NOTE      DATE OF ADMISSION:  11/05/2017.      DATE OF SERVICE:  11/06/2017       DATE OF DISCHARGE:  11/06/2017       LEGAL STATUS:  The patient is voluntary.        SOURCE OF INFORMATION:  Information was obtained from the patient and available records.      CHIEF COMPLAINT:  \"I have severe anxiety.\"        HISTORY OF PRESENT ILLNESS:  Des Brooks is a 57-year-old female dressed in hospital scrubs who was admitted for worsening depression and anxiety.  She reported daily panic attacks, crying all the time, poor appetite and poor sleep, states she has not been able to use coping skills and feels anhedonic and isolated.  The patient mentioned that she also has a passive suicidal ideation, believes that she is not able to go on.  Denies having a suicide plan or intention.  During the interview on the unit itself, her  was present and reported that patient is awake all night.  She spends the night browsing the Internet or watching TV.  She goes to bed in the morning and sleeps until noon.  The  reports that she becomes out of control and screams and cries a lot.  She is reporting high anxiety, which she has been managing with benzodiazepines.  The patient was seen in the emergency room several times in the last few months.  In June she was there with high anxiety again and reported that she was taking Effexor for many years and recently the Effexor was switched to Zoloft, which had not been very helpful.  At that time, her Xanax was increased from twice a day to 3 times a day.  Previously she was in the emergency room in May again for the same reason, increasing anxiety and depression.  She was accused of stealing.  The police were called which increased her anxiety as well.  In the emergency room, she was given benzodiazepines and sent home.  Again, there is another note from the emergency room earlier in May where she presented with anxiety and headache.  She " "requested to be given Ativan and was found to be med seeking.  The ED provider actually said \"I am getting some suspicions that she may be trying to obtain additional benzodiazepines.\"      The patient is a reliable historian.  She is very pleasant.  Her affect is flat.  She is calm and appropriate.  She does not appear to be in any distress.  Des states that she was first diagnosed with depression about 20 years ago.  She has never been hospitalized for it and never attempted suicide.  States that she was stable on a medication called Deanxit which is currently not FDA approved in [place].  She was obtaining this medication in [place] where she is from.  She states that her depression and anxiety have been up and down depending on what is going on in her life recently.  She has had more and more problems with anxiety than anything else.  She reports having daily panic attacks, feeling abdominal cramps and pain and unable to stop crying.  She is denying depression currently, although reports sleeping 4 hours a night, actually during the day, having low energy, poor appetite and no interest in going out and socializing.  She is denying suicidal ideation or self-injury behaviors.  States she has never attempted suicide or made a plan.  Currently feels safe.  The patient reports that she is not sure why she is in the hospital.  She wanted to be referred to [facility] for a partial hospitalization program near her home in [place].  She is denying having any issues with distractibility, impulsiveness, grandiosity, racing thoughts, psychomotor agitation or retardation.  She is denying any auditory and visual hallucinations, paranoia and delusions.  She denies any history of PTSD, OCD, eating disorders and borderline personality disorder.  The patient states that she moved from [place] about 10 years ago and has not worked since then.  She would like to be discharged today.  She is not interested in any medication " changes.  She does have a psychiatrist by the name of Osiel Gill MD.  She sees him every 3 weeks.      SUBSTANCE USE HISTORY:  The patient denies any alcohol abuse, street drugs and prescription medications abuse.  She has never been in detox or chemical dependency treatment.      PSYCHIATRIC HISTORY:  The patient has a history of depression and anxiety.  She has never been hospitalized for that.  She does not have a history of psychosis.      PAST PSYCHIATRIC HISTORY:  No suicide attempts, no self-injury behaviors.  Denies violence toward others.  Denies history of ECT and as far as her medication, she has only been taking Zoloft in the past, Effexor, Xanax, lorazepam and gabapentin.      PAST MEDICAL HISTORY:  Positive for anxiety, depression, GERD, hypercholesterolemia, and hypertension.      PAST SURGICAL HISTORY:  Positive for tonsillectomy as a child.  The patient denies seizures, head injuries and loss of consciousness.      ALLERGIES:  No known allergies.      FAMILY HISTORY:  Negative for mental health problems in her parents and sisters.  States 2 of her brothers have severe depression and anxiety.      SOCIAL HISTORY:  The patient was born and raised in [place].  She moved to the  about 10 years ago after she  a [place] man who lives in the .  Her educational history includes a masters in Bangladeshi literature.  She has been  for 10 years.  No children.  Currently lives with her .  She has been working as a  in 2 hospitals as well as [employer].  Current finances are obtained through her .  She has no  history.  Denies legal history and denies abuse history.      MEDICAL REVIEW OF SYSTEMS:  Please refer to the review of systems by SANDRA Strickland, on 11/06/2017 which I reviewed and confirmed.      PRIOR TO ADMISSION MEDICATIONS:   1.  B complex vitamins.   2.  Omega-3 fatty acid vitamin.   3.  Magnesium oxide.   4.  Omeprazole 20 mg twice a day.   5.   Vitamin D 1000 units every day.   6.  Phenergan 25 mg every 6 hours as needed for nausea.   7.  Atorvastatin 10 mg every evening.   8.  Sertraline 50 mg twice a day.   9.  Trazodone 25-50 bedtime as needed for sleep.   10.  Xanax 0.5 one mg every 8-12 hours as needed.   11.  Mirtazapine 7.5 mg in addition to 15 mg every night at bedtime.   12.  Cozaar 25 mg every day.   13.  Aspirin 81 mg once a day.      LABORATORY DATA:  CMP, CBC are within normal limits.  U-tox was positive for benzodiazepines.  Vitamin D was 46.  Blood glucose was slightly elevated at 103.  Her urine shows increase in pH, some blood in the urine, white blood cells, red blood cells and some bacteria.  Her UC is currently pending.  She also had an EKG done and the results showed normal EKG.      PHYSICAL EXAMINATION:   VITAL SIGNS:  Temperature 97.8 degrees Fahrenheit taken tympanically, blood pressure 114/67, pulse 70, respirations 16.  Her weight is 161 pounds and her body mass index is 24.6.      MENTAL STATUS EXAMINATION:  Appearance:  The patient is a very pleasant female dressed appropriately and clean.  She sits calmly and responds appropriately to questions.  She is alert and appeared to be in no distress.  Attitude is cooperative.  Eye contact is good.  Mood is sad.  Affect is mood congruent.  Speech is clear and coherent.  Psychomotor behavior is negative for tardive dyskinesia, dystonia or tics.  Thought process is linear and goal oriented.  There are no loose associations.  Thought content is negative for suicidal or suicidal/homicidal ideation, auditory or visual hallucinations and delusions.  Insight and judgment are intact.  She is oriented to self, place, date and situation.  Attention span and concentration is intact.  Recent and remote memory are intact.  She has no problems expressing herself.  Fund of knowledge is adequate for the education and training.      DIAGNOSES:   1.  Major depressive disorder, recurrent, moderate.   2.   Generalized anxiety disorder, recurrent, moderate.   3.  Rule out medication-induced mood disorder.   4.  Borderline personality traits are highly suspected.      RECOMMENDATIONS:  The patient is not willing to have medication changes during this hospitalization.  She does not believe that she needs to be here.  She does not know why she was transferred to an inpatient unit.  Currently, denies feeling depressed.  Denies suicidal ideation, homicidal ideation, psychosis and memory problems.  She admits of having issues with high anxiety but believes that this can be managed on an outpatient basis.  Our recommendations are to:     1.  Go off/taper down and discontinue the use of benzodiazepines.     2.  Switch from a single agent to a double agent antidepressant such as Effexor, Pristiq or Cymbalta.  She can add Wellbutrin to an SSRI or SNRI to augment their effect.  Adding gabapentin to her regimen is highly recommended, her dose can be increased to 3 times a day and she can take up to 1200 mg as a single dose.        The patient will be discharged to her home today.  She does not meet criteria to be in the hospital.  Appointments were made prior to discharge with her psychiatrist, Dr. Osiel Gill, for Wednesday, , at 8:00 in the morning.  Her therapist Parvin Lopez, whom she will need to call herself, and she works at [facility].  She was also given resources for a partial hospitalization program such as [facility] which in [place].  Patient needs to call them and set up an appointment.      ATTESTATION:  The patient has been seen and evaluated by me, JN Yeboah, CNP.         JN DURAN, CNP             D: 2017 15:11   T: 2017 16:17   MT: MARLIN      Name:     SABRINA PATEL   MRN:      [id number]        Account:        [id number]   :      1960           Admit Date:     [id number]                                  Discharge Date: 2017       Document: [id number]